# Patient Record
Sex: MALE | Race: WHITE | Employment: OTHER | URBAN - METROPOLITAN AREA
[De-identification: names, ages, dates, MRNs, and addresses within clinical notes are randomized per-mention and may not be internally consistent; named-entity substitution may affect disease eponyms.]

---

## 2017-04-10 ENCOUNTER — OFFICE VISIT (OUTPATIENT)
Dept: PULMONOLOGY | Age: 72
End: 2017-04-10
Payer: MEDICARE

## 2017-04-10 VITALS
WEIGHT: 307.8 LBS | TEMPERATURE: 97.6 F | HEIGHT: 68 IN | BODY MASS INDEX: 46.65 KG/M2 | OXYGEN SATURATION: 91 % | SYSTOLIC BLOOD PRESSURE: 99 MMHG | RESPIRATION RATE: 16 BRPM | DIASTOLIC BLOOD PRESSURE: 52 MMHG | HEART RATE: 84 BPM

## 2017-04-10 DIAGNOSIS — I10 ESSENTIAL HYPERTENSION: ICD-10-CM

## 2017-04-10 DIAGNOSIS — G47.33 OSA (OBSTRUCTIVE SLEEP APNEA): ICD-10-CM

## 2017-04-10 DIAGNOSIS — J98.4 RESTRICTIVE LUNG DISEASE SECONDARY TO OBESITY: ICD-10-CM

## 2017-04-10 DIAGNOSIS — I50.22 CHRONIC SYSTOLIC CONGESTIVE HEART FAILURE (HCC): ICD-10-CM

## 2017-04-10 DIAGNOSIS — E66.01 MORBID OBESITY WITH BMI OF 45.0-49.9, ADULT (HCC): ICD-10-CM

## 2017-04-10 DIAGNOSIS — J41.0 SIMPLE CHRONIC BRONCHITIS (HCC): Primary | ICD-10-CM

## 2017-04-10 DIAGNOSIS — E66.9 RESTRICTIVE LUNG DISEASE SECONDARY TO OBESITY: ICD-10-CM

## 2017-04-10 PROCEDURE — 3023F SPIROM DOC REV: CPT | Performed by: INTERNAL MEDICINE

## 2017-04-10 PROCEDURE — 1036F TOBACCO NON-USER: CPT | Performed by: INTERNAL MEDICINE

## 2017-04-10 PROCEDURE — 4040F PNEUMOC VAC/ADMIN/RCVD: CPT | Performed by: INTERNAL MEDICINE

## 2017-04-10 PROCEDURE — G8427 DOCREV CUR MEDS BY ELIG CLIN: HCPCS | Performed by: INTERNAL MEDICINE

## 2017-04-10 PROCEDURE — G8599 NO ASA/ANTIPLAT THER USE RNG: HCPCS | Performed by: INTERNAL MEDICINE

## 2017-04-10 PROCEDURE — 1123F ACP DISCUSS/DSCN MKR DOCD: CPT | Performed by: INTERNAL MEDICINE

## 2017-04-10 PROCEDURE — 3017F COLORECTAL CA SCREEN DOC REV: CPT | Performed by: INTERNAL MEDICINE

## 2017-04-10 PROCEDURE — G8926 SPIRO NO PERF OR DOC: HCPCS | Performed by: INTERNAL MEDICINE

## 2017-04-10 PROCEDURE — 99214 OFFICE O/P EST MOD 30 MIN: CPT | Performed by: INTERNAL MEDICINE

## 2017-04-10 PROCEDURE — G8419 CALC BMI OUT NRM PARAM NOF/U: HCPCS | Performed by: INTERNAL MEDICINE

## 2017-04-10 RX ORDER — BUDESONIDE AND FORMOTEROL FUMARATE DIHYDRATE 80; 4.5 UG/1; UG/1
2 AEROSOL RESPIRATORY (INHALATION) 2 TIMES DAILY
Qty: 1 INHALER | Refills: 11 | Status: SHIPPED | OUTPATIENT
Start: 2017-04-10 | End: 2018-02-14

## 2017-04-10 RX ORDER — ALBUTEROL SULFATE 90 UG/1
2 AEROSOL, METERED RESPIRATORY (INHALATION) EVERY 6 HOURS PRN
Qty: 1 INHALER | Refills: 11 | Status: SHIPPED | OUTPATIENT
Start: 2017-04-10

## 2017-04-10 RX ORDER — ATORVASTATIN CALCIUM 10 MG/1
10 TABLET, FILM COATED ORAL DAILY
COMMUNITY
End: 2017-08-22 | Stop reason: DRUGHIGH

## 2017-04-10 RX ORDER — BUDESONIDE AND FORMOTEROL FUMARATE DIHYDRATE 80; 4.5 UG/1; UG/1
2 AEROSOL RESPIRATORY (INHALATION) 2 TIMES DAILY
Qty: 1 INHALER | Refills: 11 | Status: SHIPPED | OUTPATIENT
Start: 2017-04-10 | End: 2017-04-10 | Stop reason: CLARIF

## 2017-04-10 RX ORDER — FUROSEMIDE 40 MG/1
40 TABLET ORAL 2 TIMES DAILY
COMMUNITY
End: 2018-03-05 | Stop reason: ALTCHOICE

## 2017-04-10 RX ORDER — ALBUTEROL SULFATE 90 UG/1
2 AEROSOL, METERED RESPIRATORY (INHALATION) EVERY 6 HOURS PRN
Qty: 1 INHALER | Refills: 11 | Status: SHIPPED | OUTPATIENT
Start: 2017-04-10 | End: 2017-04-10 | Stop reason: CLARIF

## 2017-08-04 ENCOUNTER — HOSPITAL ENCOUNTER (OUTPATIENT)
Dept: PREADMISSION TESTING | Age: 72
Discharge: HOME | End: 2017-08-04
Payer: MEDICARE

## 2017-08-04 ENCOUNTER — HOSPITAL ENCOUNTER (OUTPATIENT)
Dept: GENERAL RADIOLOGY | Age: 72
Discharge: HOME | End: 2017-08-04
Payer: MEDICARE

## 2017-08-04 VITALS
SYSTOLIC BLOOD PRESSURE: 140 MMHG | RESPIRATION RATE: 18 BRPM | BODY MASS INDEX: 46.21 KG/M2 | HEART RATE: 65 BPM | OXYGEN SATURATION: 92 % | DIASTOLIC BLOOD PRESSURE: 62 MMHG | WEIGHT: 304.9 LBS | HEIGHT: 68 IN

## 2017-08-04 LAB
ABO/RH: NORMAL
ABSOLUTE EOS #: 0.1 K/UL (ref 0–0.4)
ABSOLUTE LYMPH #: 1.3 K/UL (ref 1–4.8)
ABSOLUTE MONO #: 0.9 K/UL (ref 0.2–0.8)
ANION GAP SERPL CALCULATED.3IONS-SCNC: 13 MMOL/L (ref 9–17)
ANTIBODY SCREEN: NEGATIVE
ARM BAND NUMBER: NORMAL
BASOPHILS # BLD: 0 %
BASOPHILS ABSOLUTE: 0 K/UL (ref 0–0.2)
BUN BLDV-MCNC: 13 MG/DL (ref 8–23)
BUN/CREAT BLD: 13 (ref 9–20)
CALCIUM SERPL-MCNC: 8.9 MG/DL (ref 8.6–10.4)
CHLORIDE BLD-SCNC: 100 MMOL/L (ref 98–107)
CO2: 27 MMOL/L (ref 20–31)
CREAT SERPL-MCNC: 0.97 MG/DL (ref 0.7–1.2)
DIFFERENTIAL TYPE: ABNORMAL
EOSINOPHILS RELATIVE PERCENT: 1 %
EXPIRATION DATE: NORMAL
GFR AFRICAN AMERICAN: >60 ML/MIN
GFR NON-AFRICAN AMERICAN: >60 ML/MIN
GFR SERPL CREATININE-BSD FRML MDRD: ABNORMAL ML/MIN/{1.73_M2}
GFR SERPL CREATININE-BSD FRML MDRD: ABNORMAL ML/MIN/{1.73_M2}
GLUCOSE BLD-MCNC: 102 MG/DL (ref 70–99)
HCT VFR BLD CALC: 41.9 % (ref 41–53)
HEMOGLOBIN: 13.8 G/DL (ref 13.5–17.5)
INR BLD: 1
LYMPHOCYTES # BLD: 15 %
MCH RBC QN AUTO: 29.4 PG (ref 26–34)
MCHC RBC AUTO-ENTMCNC: 32.9 G/DL (ref 31–37)
MCV RBC AUTO: 89.2 FL (ref 80–100)
MONOCYTES # BLD: 10 %
PARTIAL THROMBOPLASTIN TIME: 30.2 SEC (ref 23–31)
PDW BLD-RTO: 16 % (ref 11.5–14.5)
PLATELET # BLD: 255 K/UL (ref 130–400)
PLATELET ESTIMATE: ABNORMAL
PMV BLD AUTO: 8.3 FL (ref 6–12)
POTASSIUM SERPL-SCNC: 4 MMOL/L (ref 3.7–5.3)
PROTHROMBIN TIME: 10 SEC (ref 9.7–11.6)
RBC # BLD: 4.69 M/UL (ref 4.5–5.9)
RBC # BLD: ABNORMAL 10*6/UL
SEG NEUTROPHILS: 74 %
SEGMENTED NEUTROPHILS ABSOLUTE COUNT: 6.8 K/UL (ref 1.8–7.7)
SODIUM BLD-SCNC: 140 MMOL/L (ref 135–144)
WBC # BLD: 9.2 K/UL (ref 3.5–11)
WBC # BLD: ABNORMAL 10*3/UL

## 2017-08-04 PROCEDURE — 85025 COMPLETE CBC W/AUTO DIFF WBC: CPT

## 2017-08-04 PROCEDURE — 36415 COLL VENOUS BLD VENIPUNCTURE: CPT

## 2017-08-04 PROCEDURE — 85730 THROMBOPLASTIN TIME PARTIAL: CPT

## 2017-08-04 PROCEDURE — 93005 ELECTROCARDIOGRAM TRACING: CPT

## 2017-08-04 PROCEDURE — 86850 RBC ANTIBODY SCREEN: CPT

## 2017-08-04 PROCEDURE — 86900 BLOOD TYPING SEROLOGIC ABO: CPT

## 2017-08-04 PROCEDURE — 86901 BLOOD TYPING SEROLOGIC RH(D): CPT

## 2017-08-04 PROCEDURE — 85610 PROTHROMBIN TIME: CPT

## 2017-08-04 PROCEDURE — 71020 XR CHEST STANDARD TWO VW: CPT

## 2017-08-04 PROCEDURE — 80048 BASIC METABOLIC PNL TOTAL CA: CPT

## 2017-08-04 RX ORDER — BUDESONIDE AND FORMOTEROL FUMARATE DIHYDRATE 80; 4.5 UG/1; UG/1
2 AEROSOL RESPIRATORY (INHALATION) 2 TIMES DAILY
Status: ON HOLD | COMMUNITY
End: 2017-08-19 | Stop reason: SDUPTHER

## 2017-08-05 LAB
EKG ATRIAL RATE: 68 BPM
EKG P AXIS: 65 DEGREES
EKG P-R INTERVAL: 240 MS
EKG Q-T INTERVAL: 434 MS
EKG QRS DURATION: 104 MS
EKG QTC CALCULATION (BAZETT): 461 MS
EKG R AXIS: -37 DEGREES
EKG T AXIS: 23 DEGREES
EKG VENTRICULAR RATE: 68 BPM

## 2017-08-17 ENCOUNTER — ANESTHESIA EVENT (OUTPATIENT)
Dept: OPERATING ROOM | Age: 72
DRG: 519 | End: 2017-08-17
Payer: MEDICARE

## 2017-08-18 ENCOUNTER — HOSPITAL ENCOUNTER (INPATIENT)
Age: 72
LOS: 1 days | Discharge: HOME | DRG: 519 | End: 2017-08-19
Attending: ORTHOPAEDIC SURGERY | Admitting: ORTHOPAEDIC SURGERY
Payer: MEDICARE

## 2017-08-18 ENCOUNTER — ANESTHESIA (OUTPATIENT)
Dept: OPERATING ROOM | Age: 72
DRG: 519 | End: 2017-08-18
Payer: MEDICARE

## 2017-08-18 ENCOUNTER — APPOINTMENT (OUTPATIENT)
Dept: GENERAL RADIOLOGY | Age: 72
DRG: 519 | End: 2017-08-18
Attending: ORTHOPAEDIC SURGERY
Payer: MEDICARE

## 2017-08-18 VITALS
TEMPERATURE: 97 F | DIASTOLIC BLOOD PRESSURE: 57 MMHG | RESPIRATION RATE: 12 BRPM | OXYGEN SATURATION: 96 % | SYSTOLIC BLOOD PRESSURE: 107 MMHG

## 2017-08-18 DIAGNOSIS — M48.061 STENOSIS, SPINAL, LUMBAR: Primary | ICD-10-CM

## 2017-08-18 LAB
GLUCOSE BLD-MCNC: 130 MG/DL (ref 75–110)
GLUCOSE BLD-MCNC: 141 MG/DL (ref 75–110)
GLUCOSE BLD-MCNC: 179 MG/DL (ref 75–110)
GLUCOSE BLD-MCNC: 197 MG/DL (ref 75–110)

## 2017-08-18 PROCEDURE — 2580000003 HC RX 258: Performed by: ANESTHESIOLOGY

## 2017-08-18 PROCEDURE — 2580000003 HC RX 258: Performed by: PHYSICIAN ASSISTANT

## 2017-08-18 PROCEDURE — 2500000003 HC RX 250 WO HCPCS: Performed by: ORTHOPAEDIC SURGERY

## 2017-08-18 PROCEDURE — 3600000002 HC SURGERY LEVEL 2 BASE: Performed by: ORTHOPAEDIC SURGERY

## 2017-08-18 PROCEDURE — 2580000003 HC RX 258: Performed by: ORTHOPAEDIC SURGERY

## 2017-08-18 PROCEDURE — 6360000002 HC RX W HCPCS: Performed by: ANESTHESIOLOGY

## 2017-08-18 PROCEDURE — 1200000000 HC SEMI PRIVATE

## 2017-08-18 PROCEDURE — 6360000002 HC RX W HCPCS: Performed by: PHYSICIAN ASSISTANT

## 2017-08-18 PROCEDURE — 6360000002 HC RX W HCPCS: Performed by: NURSE ANESTHETIST, CERTIFIED REGISTERED

## 2017-08-18 PROCEDURE — 6370000000 HC RX 637 (ALT 250 FOR IP): Performed by: PHYSICIAN ASSISTANT

## 2017-08-18 PROCEDURE — 6360000002 HC RX W HCPCS: Performed by: ORTHOPAEDIC SURGERY

## 2017-08-18 PROCEDURE — 3209999900 FLUORO FOR SURGICAL PROCEDURES

## 2017-08-18 PROCEDURE — A6219 GAUZE <= 16 SQ IN W/BORDER: HCPCS | Performed by: ORTHOPAEDIC SURGERY

## 2017-08-18 PROCEDURE — 6370000000 HC RX 637 (ALT 250 FOR IP): Performed by: ORTHOPAEDIC SURGERY

## 2017-08-18 PROCEDURE — 3700000000 HC ANESTHESIA ATTENDED CARE: Performed by: ORTHOPAEDIC SURGERY

## 2017-08-18 PROCEDURE — 3700000001 HC ADD 15 MINUTES (ANESTHESIA): Performed by: ORTHOPAEDIC SURGERY

## 2017-08-18 PROCEDURE — 3600000012 HC SURGERY LEVEL 2 ADDTL 15MIN: Performed by: ORTHOPAEDIC SURGERY

## 2017-08-18 PROCEDURE — 7100000001 HC PACU RECOVERY - ADDTL 15 MIN: Performed by: ORTHOPAEDIC SURGERY

## 2017-08-18 PROCEDURE — 2720000010 HC SURG SUPPLY STERILE: Performed by: ORTHOPAEDIC SURGERY

## 2017-08-18 PROCEDURE — A4364 ADHESIVE, LIQUID OR EQUAL: HCPCS | Performed by: ORTHOPAEDIC SURGERY

## 2017-08-18 PROCEDURE — 2500000003 HC RX 250 WO HCPCS: Performed by: NURSE ANESTHETIST, CERTIFIED REGISTERED

## 2017-08-18 PROCEDURE — 7100000000 HC PACU RECOVERY - FIRST 15 MIN: Performed by: ORTHOPAEDIC SURGERY

## 2017-08-18 PROCEDURE — 2780000010 HC IMPLANT OTHER: Performed by: ORTHOPAEDIC SURGERY

## 2017-08-18 PROCEDURE — 00NY0ZZ RELEASE LUMBAR SPINAL CORD, OPEN APPROACH: ICD-10-PCS | Performed by: ORTHOPAEDIC SURGERY

## 2017-08-18 PROCEDURE — 82947 ASSAY GLUCOSE BLOOD QUANT: CPT

## 2017-08-18 PROCEDURE — 94640 AIRWAY INHALATION TREATMENT: CPT

## 2017-08-18 RX ORDER — SODIUM CHLORIDE 9 MG/ML
INJECTION, SOLUTION INTRAVENOUS CONTINUOUS
Status: DISCONTINUED | OUTPATIENT
Start: 2017-08-18 | End: 2017-08-19 | Stop reason: HOSPADM

## 2017-08-18 RX ORDER — DEXAMETHASONE SODIUM PHOSPHATE 10 MG/ML
6 INJECTION INTRAMUSCULAR; INTRAVENOUS EVERY 8 HOURS
Status: COMPLETED | OUTPATIENT
Start: 2017-08-18 | End: 2017-08-19

## 2017-08-18 RX ORDER — HYDRALAZINE HYDROCHLORIDE 50 MG/1
50 TABLET, FILM COATED ORAL 2 TIMES DAILY
Status: DISCONTINUED | OUTPATIENT
Start: 2017-08-18 | End: 2017-08-19 | Stop reason: HOSPADM

## 2017-08-18 RX ORDER — HYDROMORPHONE HCL 110MG/55ML
0.5 PATIENT CONTROLLED ANALGESIA SYRINGE INTRAVENOUS EVERY 5 MIN PRN
Status: COMPLETED | OUTPATIENT
Start: 2017-08-18 | End: 2017-08-18

## 2017-08-18 RX ORDER — DIAZEPAM 5 MG/1
5 TABLET ORAL EVERY 6 HOURS PRN
Status: DISCONTINUED | OUTPATIENT
Start: 2017-08-18 | End: 2017-08-19 | Stop reason: HOSPADM

## 2017-08-18 RX ORDER — ONDANSETRON 2 MG/ML
INJECTION INTRAMUSCULAR; INTRAVENOUS PRN
Status: DISCONTINUED | OUTPATIENT
Start: 2017-08-18 | End: 2017-08-18 | Stop reason: SDUPTHER

## 2017-08-18 RX ORDER — DEXTROSE MONOHYDRATE 50 MG/ML
100 INJECTION, SOLUTION INTRAVENOUS PRN
Status: DISCONTINUED | OUTPATIENT
Start: 2017-08-18 | End: 2017-08-19 | Stop reason: HOSPADM

## 2017-08-18 RX ORDER — VECURONIUM BROMIDE 1 MG/ML
INJECTION, POWDER, LYOPHILIZED, FOR SOLUTION INTRAVENOUS PRN
Status: DISCONTINUED | OUTPATIENT
Start: 2017-08-18 | End: 2017-08-18 | Stop reason: SDUPTHER

## 2017-08-18 RX ORDER — DEXAMETHASONE SODIUM PHOSPHATE 10 MG/ML
4 INJECTION INTRAMUSCULAR; INTRAVENOUS
Status: DISCONTINUED | OUTPATIENT
Start: 2017-08-18 | End: 2017-08-18 | Stop reason: HOSPADM

## 2017-08-18 RX ORDER — FUROSEMIDE 40 MG/1
40 TABLET ORAL 2 TIMES DAILY
Status: DISCONTINUED | OUTPATIENT
Start: 2017-08-18 | End: 2017-08-19 | Stop reason: HOSPADM

## 2017-08-18 RX ORDER — FENTANYL CITRATE 50 UG/ML
25 INJECTION, SOLUTION INTRAMUSCULAR; INTRAVENOUS EVERY 5 MIN PRN
Status: DISCONTINUED | OUTPATIENT
Start: 2017-08-18 | End: 2017-08-18 | Stop reason: HOSPADM

## 2017-08-18 RX ORDER — NITROGLYCERIN 0.4 MG/1
0.4 TABLET SUBLINGUAL EVERY 5 MIN PRN
Status: DISCONTINUED | OUTPATIENT
Start: 2017-08-18 | End: 2017-08-19 | Stop reason: HOSPADM

## 2017-08-18 RX ORDER — FLUTICASONE PROPIONATE 50 MCG
2 SPRAY, SUSPENSION (ML) NASAL DAILY
Status: DISCONTINUED | OUTPATIENT
Start: 2017-08-18 | End: 2017-08-19 | Stop reason: HOSPADM

## 2017-08-18 RX ORDER — DEXAMETHASONE SODIUM PHOSPHATE 10 MG/ML
INJECTION INTRAMUSCULAR; INTRAVENOUS PRN
Status: DISCONTINUED | OUTPATIENT
Start: 2017-08-18 | End: 2017-08-18 | Stop reason: SDUPTHER

## 2017-08-18 RX ORDER — ATORVASTATIN CALCIUM 10 MG/1
10 TABLET, FILM COATED ORAL NIGHTLY
Status: DISCONTINUED | OUTPATIENT
Start: 2017-08-18 | End: 2017-08-19 | Stop reason: HOSPADM

## 2017-08-18 RX ORDER — GLYCOPYRROLATE 0.2 MG/ML
INJECTION INTRAMUSCULAR; INTRAVENOUS PRN
Status: DISCONTINUED | OUTPATIENT
Start: 2017-08-18 | End: 2017-08-18 | Stop reason: SDUPTHER

## 2017-08-18 RX ORDER — DEXTROSE MONOHYDRATE 25 G/50ML
12.5 INJECTION, SOLUTION INTRAVENOUS PRN
Status: DISCONTINUED | OUTPATIENT
Start: 2017-08-18 | End: 2017-08-19 | Stop reason: HOSPADM

## 2017-08-18 RX ORDER — SODIUM CHLORIDE 0.9 % (FLUSH) 0.9 %
10 SYRINGE (ML) INJECTION PRN
Status: DISCONTINUED | OUTPATIENT
Start: 2017-08-18 | End: 2017-08-19 | Stop reason: HOSPADM

## 2017-08-18 RX ORDER — HYDROMORPHONE HCL 110MG/55ML
0.5 PATIENT CONTROLLED ANALGESIA SYRINGE INTRAVENOUS EVERY 5 MIN PRN
Status: DISCONTINUED | OUTPATIENT
Start: 2017-08-18 | End: 2017-08-18 | Stop reason: HOSPADM

## 2017-08-18 RX ORDER — MORPHINE SULFATE 4 MG/ML
4 INJECTION, SOLUTION INTRAMUSCULAR; INTRAVENOUS
Status: DISCONTINUED | OUTPATIENT
Start: 2017-08-18 | End: 2017-08-19 | Stop reason: HOSPADM

## 2017-08-18 RX ORDER — LISINOPRIL 40 MG/1
40 TABLET ORAL DAILY
Status: DISCONTINUED | OUTPATIENT
Start: 2017-08-18 | End: 2017-08-19 | Stop reason: HOSPADM

## 2017-08-18 RX ORDER — SODIUM CHLORIDE 0.9 % (FLUSH) 0.9 %
10 SYRINGE (ML) INJECTION EVERY 12 HOURS SCHEDULED
Status: DISCONTINUED | OUTPATIENT
Start: 2017-08-18 | End: 2017-08-19 | Stop reason: HOSPADM

## 2017-08-18 RX ORDER — SUCCINYLCHOLINE CHLORIDE 20 MG/ML
INJECTION INTRAMUSCULAR; INTRAVENOUS PRN
Status: DISCONTINUED | OUTPATIENT
Start: 2017-08-18 | End: 2017-08-18 | Stop reason: SDUPTHER

## 2017-08-18 RX ORDER — MORPHINE SULFATE 2 MG/ML
2 INJECTION, SOLUTION INTRAMUSCULAR; INTRAVENOUS
Status: DISCONTINUED | OUTPATIENT
Start: 2017-08-18 | End: 2017-08-19 | Stop reason: HOSPADM

## 2017-08-18 RX ORDER — ALBUTEROL SULFATE 90 UG/1
2 AEROSOL, METERED RESPIRATORY (INHALATION) EVERY 6 HOURS PRN
Status: DISCONTINUED | OUTPATIENT
Start: 2017-08-18 | End: 2017-08-19 | Stop reason: HOSPADM

## 2017-08-18 RX ORDER — SODIUM CHLORIDE, SODIUM LACTATE, POTASSIUM CHLORIDE, CALCIUM CHLORIDE 600; 310; 30; 20 MG/100ML; MG/100ML; MG/100ML; MG/100ML
INJECTION, SOLUTION INTRAVENOUS CONTINUOUS
Status: DISCONTINUED | OUTPATIENT
Start: 2017-08-18 | End: 2017-08-18

## 2017-08-18 RX ORDER — DOCUSATE SODIUM 100 MG/1
100 CAPSULE, LIQUID FILLED ORAL 2 TIMES DAILY
Status: DISCONTINUED | OUTPATIENT
Start: 2017-08-18 | End: 2017-08-19 | Stop reason: HOSPADM

## 2017-08-18 RX ORDER — LANOLIN ALCOHOL/MO/W.PET/CERES
1000 CREAM (GRAM) TOPICAL DAILY
Status: DISCONTINUED | OUTPATIENT
Start: 2017-08-18 | End: 2017-08-19 | Stop reason: HOSPADM

## 2017-08-18 RX ORDER — DIPHENHYDRAMINE HYDROCHLORIDE 50 MG/ML
12.5 INJECTION INTRAMUSCULAR; INTRAVENOUS
Status: DISCONTINUED | OUTPATIENT
Start: 2017-08-18 | End: 2017-08-18 | Stop reason: HOSPADM

## 2017-08-18 RX ORDER — MORPHINE SULFATE 15 MG/1
15 TABLET, FILM COATED, EXTENDED RELEASE ORAL EVERY 12 HOURS SCHEDULED
Status: DISCONTINUED | OUTPATIENT
Start: 2017-08-18 | End: 2017-08-19 | Stop reason: HOSPADM

## 2017-08-18 RX ORDER — LABETALOL HYDROCHLORIDE 5 MG/ML
5 INJECTION, SOLUTION INTRAVENOUS EVERY 10 MIN PRN
Status: DISCONTINUED | OUTPATIENT
Start: 2017-08-18 | End: 2017-08-18 | Stop reason: HOSPADM

## 2017-08-18 RX ORDER — BUPIVACAINE HYDROCHLORIDE AND EPINEPHRINE 5; 5 MG/ML; UG/ML
INJECTION, SOLUTION EPIDURAL; INTRACAUDAL; PERINEURAL PRN
Status: DISCONTINUED | OUTPATIENT
Start: 2017-08-18 | End: 2017-08-18 | Stop reason: HOSPADM

## 2017-08-18 RX ORDER — LIDOCAINE HYDROCHLORIDE 20 MG/ML
INJECTION, SOLUTION INFILTRATION; PERINEURAL PRN
Status: DISCONTINUED | OUTPATIENT
Start: 2017-08-18 | End: 2017-08-18 | Stop reason: SDUPTHER

## 2017-08-18 RX ORDER — SODIUM CHLORIDE 0.9 % (FLUSH) 0.9 %
10 SYRINGE (ML) INJECTION PRN
Status: DISCONTINUED | OUTPATIENT
Start: 2017-08-18 | End: 2017-08-18 | Stop reason: HOSPADM

## 2017-08-18 RX ORDER — HYDRALAZINE HYDROCHLORIDE 20 MG/ML
5 INJECTION INTRAMUSCULAR; INTRAVENOUS EVERY 10 MIN PRN
Status: DISCONTINUED | OUTPATIENT
Start: 2017-08-18 | End: 2017-08-18 | Stop reason: HOSPADM

## 2017-08-18 RX ORDER — FENTANYL CITRATE 50 UG/ML
INJECTION, SOLUTION INTRAMUSCULAR; INTRAVENOUS PRN
Status: DISCONTINUED | OUTPATIENT
Start: 2017-08-18 | End: 2017-08-18 | Stop reason: SDUPTHER

## 2017-08-18 RX ORDER — NICOTINE POLACRILEX 4 MG
15 LOZENGE BUCCAL PRN
Status: DISCONTINUED | OUTPATIENT
Start: 2017-08-18 | End: 2017-08-19 | Stop reason: HOSPADM

## 2017-08-18 RX ORDER — KETAMINE HYDROCHLORIDE 50 MG/ML
INJECTION, SOLUTION, CONCENTRATE INTRAMUSCULAR; INTRAVENOUS PRN
Status: DISCONTINUED | OUTPATIENT
Start: 2017-08-18 | End: 2017-08-18 | Stop reason: SDUPTHER

## 2017-08-18 RX ORDER — SODIUM CHLORIDE 0.9 % (FLUSH) 0.9 %
10 SYRINGE (ML) INJECTION EVERY 12 HOURS SCHEDULED
Status: DISCONTINUED | OUTPATIENT
Start: 2017-08-18 | End: 2017-08-18 | Stop reason: HOSPADM

## 2017-08-18 RX ORDER — PROPOFOL 10 MG/ML
INJECTION, EMULSION INTRAVENOUS PRN
Status: DISCONTINUED | OUTPATIENT
Start: 2017-08-18 | End: 2017-08-18 | Stop reason: SDUPTHER

## 2017-08-18 RX ORDER — MORPHINE SULFATE 15 MG/1
15 TABLET, FILM COATED, EXTENDED RELEASE ORAL EVERY 12 HOURS SCHEDULED
Status: DISCONTINUED | OUTPATIENT
Start: 2017-08-18 | End: 2017-08-18

## 2017-08-18 RX ORDER — OXYCODONE HYDROCHLORIDE AND ACETAMINOPHEN 5; 325 MG/1; MG/1
1 TABLET ORAL EVERY 4 HOURS PRN
Status: DISCONTINUED | OUTPATIENT
Start: 2017-08-18 | End: 2017-08-19 | Stop reason: HOSPADM

## 2017-08-18 RX ORDER — OXYCODONE HYDROCHLORIDE AND ACETAMINOPHEN 5; 325 MG/1; MG/1
2 TABLET ORAL EVERY 4 HOURS PRN
Status: DISCONTINUED | OUTPATIENT
Start: 2017-08-18 | End: 2017-08-19 | Stop reason: HOSPADM

## 2017-08-18 RX ORDER — ONDANSETRON 2 MG/ML
4 INJECTION INTRAMUSCULAR; INTRAVENOUS
Status: COMPLETED | OUTPATIENT
Start: 2017-08-18 | End: 2017-08-18

## 2017-08-18 RX ORDER — MEPERIDINE HYDROCHLORIDE 25 MG/ML
12.5 INJECTION INTRAMUSCULAR; INTRAVENOUS; SUBCUTANEOUS EVERY 5 MIN PRN
Status: DISCONTINUED | OUTPATIENT
Start: 2017-08-18 | End: 2017-08-18 | Stop reason: HOSPADM

## 2017-08-18 RX ORDER — ONDANSETRON 2 MG/ML
4 INJECTION INTRAMUSCULAR; INTRAVENOUS EVERY 6 HOURS PRN
Status: DISCONTINUED | OUTPATIENT
Start: 2017-08-18 | End: 2017-08-19 | Stop reason: HOSPADM

## 2017-08-18 RX ORDER — AMLODIPINE BESYLATE 10 MG/1
10 TABLET ORAL DAILY
Status: DISCONTINUED | OUTPATIENT
Start: 2017-08-19 | End: 2017-08-19 | Stop reason: HOSPADM

## 2017-08-18 RX ORDER — METOPROLOL TARTRATE 50 MG/1
50 TABLET, FILM COATED ORAL 2 TIMES DAILY
Status: DISCONTINUED | OUTPATIENT
Start: 2017-08-18 | End: 2017-08-19 | Stop reason: HOSPADM

## 2017-08-18 RX ORDER — OXYCODONE HYDROCHLORIDE 5 MG/1
5 TABLET ORAL EVERY 4 HOURS PRN
Status: DISCONTINUED | OUTPATIENT
Start: 2017-08-18 | End: 2017-08-19 | Stop reason: HOSPADM

## 2017-08-18 RX ORDER — EPHEDRINE SULFATE 50 MG/ML
INJECTION, SOLUTION INTRAVENOUS PRN
Status: DISCONTINUED | OUTPATIENT
Start: 2017-08-18 | End: 2017-08-18 | Stop reason: SDUPTHER

## 2017-08-18 RX ORDER — GABAPENTIN 300 MG/1
300 CAPSULE ORAL 2 TIMES DAILY
Status: DISCONTINUED | OUTPATIENT
Start: 2017-08-18 | End: 2017-08-19 | Stop reason: HOSPADM

## 2017-08-18 RX ADMIN — SUCCINYLCHOLINE CHLORIDE 120 MG: 20 INJECTION, SOLUTION INTRAMUSCULAR; INTRAVENOUS at 11:53

## 2017-08-18 RX ADMIN — Medication 0.2 MG: at 12:14

## 2017-08-18 RX ADMIN — DEXAMETHASONE SODIUM PHOSPHATE 10 MG: 10 INJECTION INTRAMUSCULAR; INTRAVENOUS at 11:47

## 2017-08-18 RX ADMIN — SUGAMMADEX 200 MG: 100 INJECTION, SOLUTION INTRAVENOUS at 13:51

## 2017-08-18 RX ADMIN — VECURONIUM BROMIDE 3 MG: 1 INJECTION, POWDER, LYOPHILIZED, FOR SOLUTION INTRAVENOUS at 12:13

## 2017-08-18 RX ADMIN — EPHEDRINE SULFATE 10 MG: 50 INJECTION INTRAMUSCULAR; INTRAVENOUS; SUBCUTANEOUS at 12:13

## 2017-08-18 RX ADMIN — CEFAZOLIN 3 G: 1 INJECTION, POWDER, FOR SOLUTION INTRAMUSCULAR; INTRAVENOUS at 21:10

## 2017-08-18 RX ADMIN — EPHEDRINE SULFATE 10 MG: 50 INJECTION INTRAMUSCULAR; INTRAVENOUS; SUBCUTANEOUS at 13:27

## 2017-08-18 RX ADMIN — HYDROMORPHONE HYDROCHLORIDE 0.5 MG: 2 INJECTION, SOLUTION INTRAMUSCULAR; INTRAVENOUS; SUBCUTANEOUS at 15:00

## 2017-08-18 RX ADMIN — SODIUM CHLORIDE: 9 INJECTION, SOLUTION INTRAVENOUS at 15:58

## 2017-08-18 RX ADMIN — LIDOCAINE HYDROCHLORIDE 100 MG: 20 INJECTION, SOLUTION INFILTRATION; PERINEURAL at 11:53

## 2017-08-18 RX ADMIN — EPHEDRINE SULFATE 10 MG: 50 INJECTION INTRAMUSCULAR; INTRAVENOUS; SUBCUTANEOUS at 12:25

## 2017-08-18 RX ADMIN — SODIUM CHLORIDE, POTASSIUM CHLORIDE, SODIUM LACTATE AND CALCIUM CHLORIDE: 600; 310; 30; 20 INJECTION, SOLUTION INTRAVENOUS at 11:39

## 2017-08-18 RX ADMIN — KETAMINE HYDROCHLORIDE 50 MG: 50 INJECTION, SOLUTION INTRAMUSCULAR; INTRAVENOUS at 12:12

## 2017-08-18 RX ADMIN — PROPOFOL 150 MG: 10 INJECTION, EMULSION INTRAVENOUS at 11:53

## 2017-08-18 RX ADMIN — HYDROMORPHONE HYDROCHLORIDE 0.5 MG: 2 INJECTION, SOLUTION INTRAMUSCULAR; INTRAVENOUS; SUBCUTANEOUS at 14:46

## 2017-08-18 RX ADMIN — SODIUM CHLORIDE, POTASSIUM CHLORIDE, SODIUM LACTATE AND CALCIUM CHLORIDE: 600; 310; 30; 20 INJECTION, SOLUTION INTRAVENOUS at 12:26

## 2017-08-18 RX ADMIN — MOMETASONE FUROATE AND FORMOTEROL FUMARATE DIHYDRATE 2 PUFF: 100; 5 AEROSOL RESPIRATORY (INHALATION) at 20:01

## 2017-08-18 RX ADMIN — FENTANYL CITRATE 50 MCG: 50 INJECTION, SOLUTION INTRAMUSCULAR; INTRAVENOUS at 13:20

## 2017-08-18 RX ADMIN — HYDRALAZINE HYDROCHLORIDE 50 MG: 50 TABLET ORAL at 21:20

## 2017-08-18 RX ADMIN — FENTANYL CITRATE 50 MCG: 50 INJECTION, SOLUTION INTRAMUSCULAR; INTRAVENOUS at 12:31

## 2017-08-18 RX ADMIN — FENTANYL CITRATE 100 MCG: 50 INJECTION, SOLUTION INTRAMUSCULAR; INTRAVENOUS at 11:53

## 2017-08-18 RX ADMIN — HYDROMORPHONE HYDROCHLORIDE 0.5 MG: 2 INJECTION, SOLUTION INTRAMUSCULAR; INTRAVENOUS; SUBCUTANEOUS at 15:23

## 2017-08-18 RX ADMIN — DEXAMETHASONE SODIUM PHOSPHATE 6 MG: 10 INJECTION INTRAMUSCULAR; INTRAVENOUS at 21:20

## 2017-08-18 RX ADMIN — VECURONIUM BROMIDE 5 MG: 1 INJECTION, POWDER, LYOPHILIZED, FOR SOLUTION INTRAVENOUS at 11:57

## 2017-08-18 RX ADMIN — FENTANYL CITRATE 50 MCG: 50 INJECTION, SOLUTION INTRAMUSCULAR; INTRAVENOUS at 13:25

## 2017-08-18 RX ADMIN — DIAZEPAM 5 MG: 5 TABLET ORAL at 23:43

## 2017-08-18 RX ADMIN — ONDANSETRON 4 MG: 2 INJECTION INTRAMUSCULAR; INTRAVENOUS at 11:47

## 2017-08-18 RX ADMIN — MORPHINE SULFATE 4 MG: 4 INJECTION, SOLUTION INTRAMUSCULAR; INTRAVENOUS at 18:03

## 2017-08-18 RX ADMIN — EPHEDRINE SULFATE 10 MG: 50 INJECTION INTRAMUSCULAR; INTRAVENOUS; SUBCUTANEOUS at 12:44

## 2017-08-18 RX ADMIN — PROPOFOL 50 MG: 10 INJECTION, EMULSION INTRAVENOUS at 14:08

## 2017-08-18 RX ADMIN — MORPHINE SULFATE 15 MG: 15 TABLET, FILM COATED, EXTENDED RELEASE ORAL at 21:10

## 2017-08-18 RX ADMIN — HYDROMORPHONE HYDROCHLORIDE 0.5 MG: 2 INJECTION, SOLUTION INTRAMUSCULAR; INTRAVENOUS; SUBCUTANEOUS at 15:15

## 2017-08-18 RX ADMIN — MORPHINE SULFATE 4 MG: 4 INJECTION, SOLUTION INTRAMUSCULAR; INTRAVENOUS at 15:58

## 2017-08-18 RX ADMIN — EPHEDRINE SULFATE 10 MG: 50 INJECTION INTRAMUSCULAR; INTRAVENOUS; SUBCUTANEOUS at 12:08

## 2017-08-18 RX ADMIN — ONDANSETRON 4 MG: 2 INJECTION INTRAMUSCULAR; INTRAVENOUS at 14:56

## 2017-08-18 RX ADMIN — INSULIN LISPRO 1 UNITS: 100 INJECTION, SOLUTION INTRAVENOUS; SUBCUTANEOUS at 21:22

## 2017-08-18 RX ADMIN — SODIUM CHLORIDE, POTASSIUM CHLORIDE, SODIUM LACTATE AND CALCIUM CHLORIDE: 600; 310; 30; 20 INJECTION, SOLUTION INTRAVENOUS at 15:37

## 2017-08-18 RX ADMIN — CEFAZOLIN SODIUM 3 G: 10 INJECTION, POWDER, FOR SOLUTION INTRAVENOUS at 12:00

## 2017-08-18 ASSESSMENT — PAIN DESCRIPTION - ONSET: ONSET: ON-GOING

## 2017-08-18 ASSESSMENT — PAIN DESCRIPTION - PROGRESSION
CLINICAL_PROGRESSION: NOT CHANGED
CLINICAL_PROGRESSION: GRADUALLY IMPROVING

## 2017-08-18 ASSESSMENT — PAIN SCALES - GENERAL
PAINLEVEL_OUTOF10: 3
PAINLEVEL_OUTOF10: 7
PAINLEVEL_OUTOF10: 8
PAINLEVEL_OUTOF10: 7
PAINLEVEL_OUTOF10: 6
PAINLEVEL_OUTOF10: 10
PAINLEVEL_OUTOF10: 7
PAINLEVEL_OUTOF10: 0
PAINLEVEL_OUTOF10: 0
PAINLEVEL_OUTOF10: 9

## 2017-08-18 ASSESSMENT — PAIN DESCRIPTION - ORIENTATION
ORIENTATION: LOWER;MID
ORIENTATION: LOWER;MID

## 2017-08-18 ASSESSMENT — PAIN DESCRIPTION - DESCRIPTORS
DESCRIPTORS: CONSTANT;SHARP
DESCRIPTORS: CONSTANT;SHARP

## 2017-08-18 ASSESSMENT — PAIN DESCRIPTION - FREQUENCY
FREQUENCY: CONTINUOUS
FREQUENCY: CONTINUOUS

## 2017-08-18 ASSESSMENT — PAIN DESCRIPTION - PAIN TYPE
TYPE: SURGICAL PAIN

## 2017-08-18 ASSESSMENT — PAIN - FUNCTIONAL ASSESSMENT: PAIN_FUNCTIONAL_ASSESSMENT: 0-10

## 2017-08-18 ASSESSMENT — PAIN DESCRIPTION - LOCATION
LOCATION: BACK

## 2017-08-19 VITALS
RESPIRATION RATE: 19 BRPM | TEMPERATURE: 98.1 F | HEIGHT: 70 IN | HEART RATE: 79 BPM | SYSTOLIC BLOOD PRESSURE: 118 MMHG | WEIGHT: 298 LBS | OXYGEN SATURATION: 93 % | DIASTOLIC BLOOD PRESSURE: 62 MMHG | BODY MASS INDEX: 42.66 KG/M2

## 2017-08-19 LAB
ABSOLUTE EOS #: 0 K/UL (ref 0–0.4)
ABSOLUTE LYMPH #: 0.4 K/UL (ref 1–4.8)
ABSOLUTE MONO #: 0.4 K/UL (ref 0.2–0.8)
ANION GAP SERPL CALCULATED.3IONS-SCNC: 14 MMOL/L (ref 9–17)
BASOPHILS # BLD: 0 %
BASOPHILS ABSOLUTE: 0 K/UL (ref 0–0.2)
BUN BLDV-MCNC: 17 MG/DL (ref 8–23)
BUN/CREAT BLD: 16 (ref 9–20)
CALCIUM SERPL-MCNC: 8.4 MG/DL (ref 8.6–10.4)
CHLORIDE BLD-SCNC: 99 MMOL/L (ref 98–107)
CO2: 25 MMOL/L (ref 20–31)
CREAT SERPL-MCNC: 1.05 MG/DL (ref 0.7–1.2)
DIFFERENTIAL TYPE: ABNORMAL
EOSINOPHILS RELATIVE PERCENT: 0 %
GFR AFRICAN AMERICAN: >60 ML/MIN
GFR NON-AFRICAN AMERICAN: >60 ML/MIN
GFR SERPL CREATININE-BSD FRML MDRD: ABNORMAL ML/MIN/{1.73_M2}
GFR SERPL CREATININE-BSD FRML MDRD: ABNORMAL ML/MIN/{1.73_M2}
GLUCOSE BLD-MCNC: 228 MG/DL (ref 75–110)
GLUCOSE BLD-MCNC: 236 MG/DL (ref 75–110)
GLUCOSE BLD-MCNC: 262 MG/DL (ref 70–99)
HCT VFR BLD CALC: 38.5 % (ref 41–53)
HEMOGLOBIN: 12.4 G/DL (ref 13.5–17.5)
LYMPHOCYTES # BLD: 3 %
MAGNESIUM: 2 MG/DL (ref 1.6–2.6)
MCH RBC QN AUTO: 29 PG (ref 26–34)
MCHC RBC AUTO-ENTMCNC: 32.3 G/DL (ref 31–37)
MCV RBC AUTO: 89.9 FL (ref 80–100)
MONOCYTES # BLD: 3 %
PDW BLD-RTO: 16 % (ref 11.5–14.5)
PLATELET # BLD: 252 K/UL (ref 130–400)
PLATELET ESTIMATE: ABNORMAL
PMV BLD AUTO: 8.2 FL (ref 6–12)
POTASSIUM SERPL-SCNC: 4.5 MMOL/L (ref 3.7–5.3)
RBC # BLD: 4.28 M/UL (ref 4.5–5.9)
RBC # BLD: ABNORMAL 10*6/UL
SEG NEUTROPHILS: 94 %
SEGMENTED NEUTROPHILS ABSOLUTE COUNT: 13.8 K/UL (ref 1.8–7.7)
SODIUM BLD-SCNC: 138 MMOL/L (ref 135–144)
WBC # BLD: 14.6 K/UL (ref 3.5–11)
WBC # BLD: ABNORMAL 10*3/UL

## 2017-08-19 PROCEDURE — G0009 ADMIN PNEUMOCOCCAL VACCINE: HCPCS | Performed by: ORTHOPAEDIC SURGERY

## 2017-08-19 PROCEDURE — 6370000000 HC RX 637 (ALT 250 FOR IP): Performed by: PHYSICIAN ASSISTANT

## 2017-08-19 PROCEDURE — 51798 US URINE CAPACITY MEASURE: CPT

## 2017-08-19 PROCEDURE — 94760 N-INVAS EAR/PLS OXIMETRY 1: CPT

## 2017-08-19 PROCEDURE — 97116 GAIT TRAINING THERAPY: CPT

## 2017-08-19 PROCEDURE — 6370000000 HC RX 637 (ALT 250 FOR IP): Performed by: ORTHOPAEDIC SURGERY

## 2017-08-19 PROCEDURE — 6360000002 HC RX W HCPCS: Performed by: PHYSICIAN ASSISTANT

## 2017-08-19 PROCEDURE — 94640 AIRWAY INHALATION TREATMENT: CPT

## 2017-08-19 PROCEDURE — 2580000003 HC RX 258: Performed by: PHYSICIAN ASSISTANT

## 2017-08-19 PROCEDURE — 36415 COLL VENOUS BLD VENIPUNCTURE: CPT

## 2017-08-19 PROCEDURE — 2700000000 HC OXYGEN THERAPY PER DAY

## 2017-08-19 PROCEDURE — 97161 PT EVAL LOW COMPLEX 20 MIN: CPT

## 2017-08-19 PROCEDURE — 97530 THERAPEUTIC ACTIVITIES: CPT

## 2017-08-19 PROCEDURE — 80048 BASIC METABOLIC PNL TOTAL CA: CPT

## 2017-08-19 PROCEDURE — 90670 PCV13 VACCINE IM: CPT | Performed by: ORTHOPAEDIC SURGERY

## 2017-08-19 PROCEDURE — 6360000002 HC RX W HCPCS: Performed by: ORTHOPAEDIC SURGERY

## 2017-08-19 PROCEDURE — 82947 ASSAY GLUCOSE BLOOD QUANT: CPT

## 2017-08-19 PROCEDURE — 83735 ASSAY OF MAGNESIUM: CPT

## 2017-08-19 PROCEDURE — G8979 MOBILITY GOAL STATUS: HCPCS

## 2017-08-19 PROCEDURE — G8978 MOBILITY CURRENT STATUS: HCPCS

## 2017-08-19 PROCEDURE — 85025 COMPLETE CBC W/AUTO DIFF WBC: CPT

## 2017-08-19 RX ORDER — PSEUDOEPHEDRINE HCL 30 MG
100 TABLET ORAL 2 TIMES DAILY
Qty: 60 CAPSULE | Refills: 0 | Status: SHIPPED | OUTPATIENT
Start: 2017-08-19 | End: 2017-08-22 | Stop reason: DRUGHIGH

## 2017-08-19 RX ORDER — MORPHINE SULFATE 15 MG/1
15 TABLET, FILM COATED, EXTENDED RELEASE ORAL EVERY 12 HOURS SCHEDULED
Qty: 14 TABLET | Refills: 0 | Status: SHIPPED | OUTPATIENT
Start: 2017-08-19 | End: 2018-02-13 | Stop reason: ALTCHOICE

## 2017-08-19 RX ORDER — SENNOSIDES 8.6 MG
1300 CAPSULE ORAL NIGHTLY
Status: ON HOLD | COMMUNITY
End: 2017-08-19 | Stop reason: HOSPADM

## 2017-08-19 RX ORDER — OXYCODONE HYDROCHLORIDE AND ACETAMINOPHEN 5; 325 MG/1; MG/1
1-2 TABLET ORAL EVERY 4 HOURS PRN
Qty: 80 TABLET | Refills: 0 | Status: SHIPPED | OUTPATIENT
Start: 2017-08-19 | End: 2017-08-26

## 2017-08-19 RX ADMIN — FLUTICASONE PROPIONATE 2 SPRAY: 50 SPRAY, METERED NASAL at 09:03

## 2017-08-19 RX ADMIN — INSULIN LISPRO 4 UNITS: 100 INJECTION, SOLUTION INTRAVENOUS; SUBCUTANEOUS at 11:51

## 2017-08-19 RX ADMIN — FUROSEMIDE 40 MG: 40 TABLET ORAL at 09:01

## 2017-08-19 RX ADMIN — VITAMIN D, TAB 1000IU (100/BT) 1000 UNITS: 25 TAB at 09:01

## 2017-08-19 RX ADMIN — GABAPENTIN 300 MG: 300 CAPSULE ORAL at 09:01

## 2017-08-19 RX ADMIN — DOCUSATE SODIUM 100 MG: 100 CAPSULE, LIQUID FILLED ORAL at 09:01

## 2017-08-19 RX ADMIN — METOPROLOL TARTRATE 50 MG: 50 TABLET ORAL at 09:01

## 2017-08-19 RX ADMIN — MORPHINE SULFATE 15 MG: 15 TABLET, FILM COATED, EXTENDED RELEASE ORAL at 09:01

## 2017-08-19 RX ADMIN — PNEUMOCOCCAL 13-VALENT CONJUGATE VACCINE 0.5 ML: 2.2; 2.2; 2.2; 2.2; 2.2; 4.4; 2.2; 2.2; 2.2; 2.2; 2.2; 2.2; 2.2 INJECTION, SUSPENSION INTRAMUSCULAR at 09:02

## 2017-08-19 RX ADMIN — LISINOPRIL 40 MG: 40 TABLET ORAL at 09:01

## 2017-08-19 RX ADMIN — HYDRALAZINE HYDROCHLORIDE 50 MG: 50 TABLET ORAL at 09:01

## 2017-08-19 RX ADMIN — DEXAMETHASONE SODIUM PHOSPHATE 6 MG: 10 INJECTION INTRAMUSCULAR; INTRAVENOUS at 04:05

## 2017-08-19 RX ADMIN — OXYCODONE HYDROCHLORIDE AND ACETAMINOPHEN 2 TABLET: 5; 325 TABLET ORAL at 00:56

## 2017-08-19 RX ADMIN — AMLODIPINE BESYLATE 10 MG: 10 TABLET ORAL at 09:01

## 2017-08-19 RX ADMIN — OXYCODONE HYDROCHLORIDE 5 MG: 5 TABLET ORAL at 04:18

## 2017-08-19 RX ADMIN — SODIUM CHLORIDE: 9 INJECTION, SOLUTION INTRAVENOUS at 03:53

## 2017-08-19 RX ADMIN — CEFAZOLIN 3 G: 1 INJECTION, POWDER, FOR SOLUTION INTRAMUSCULAR; INTRAVENOUS at 04:18

## 2017-08-19 RX ADMIN — MOMETASONE FUROATE AND FORMOTEROL FUMARATE DIHYDRATE 2 PUFF: 100; 5 AEROSOL RESPIRATORY (INHALATION) at 10:09

## 2017-08-19 RX ADMIN — DEXAMETHASONE SODIUM PHOSPHATE 6 MG: 10 INJECTION INTRAMUSCULAR; INTRAVENOUS at 11:51

## 2017-08-19 RX ADMIN — TIOTROPIUM BROMIDE INHALATION SPRAY 2 PUFF: 3.12 SPRAY, METERED RESPIRATORY (INHALATION) at 10:08

## 2017-08-19 RX ADMIN — INSULIN LISPRO 6 UNITS: 100 INJECTION, SOLUTION INTRAVENOUS; SUBCUTANEOUS at 09:04

## 2017-08-19 RX ADMIN — CYANOCOBALAMIN TAB 1000 MCG 1000 MCG: 1000 TAB at 09:01

## 2017-08-19 ASSESSMENT — PAIN DESCRIPTION - FREQUENCY
FREQUENCY: CONTINUOUS
FREQUENCY: CONTINUOUS
FREQUENCY: INTERMITTENT

## 2017-08-19 ASSESSMENT — PAIN DESCRIPTION - PAIN TYPE
TYPE: SURGICAL PAIN

## 2017-08-19 ASSESSMENT — PAIN SCALES - GENERAL
PAINLEVEL_OUTOF10: 3
PAINLEVEL_OUTOF10: 4
PAINLEVEL_OUTOF10: 6
PAINLEVEL_OUTOF10: 6
PAINLEVEL_OUTOF10: 3
PAINLEVEL_OUTOF10: 0
PAINLEVEL_OUTOF10: 4

## 2017-08-19 ASSESSMENT — PAIN DESCRIPTION - ONSET
ONSET: ON-GOING
ONSET: ON-GOING

## 2017-08-19 ASSESSMENT — PAIN DESCRIPTION - ORIENTATION
ORIENTATION: INNER;MID
ORIENTATION: LOWER;MID
ORIENTATION: LOWER
ORIENTATION: LOWER

## 2017-08-19 ASSESSMENT — PAIN DESCRIPTION - PROGRESSION: CLINICAL_PROGRESSION: GRADUALLY IMPROVING

## 2017-08-19 ASSESSMENT — PAIN DESCRIPTION - LOCATION
LOCATION: BACK

## 2017-08-19 ASSESSMENT — PAIN DESCRIPTION - DESCRIPTORS
DESCRIPTORS: CONSTANT
DESCRIPTORS: CONSTANT
DESCRIPTORS: ACHING;DULL

## 2017-08-20 ENCOUNTER — CARE COORDINATION (OUTPATIENT)
Dept: CASE MANAGEMENT | Age: 72
End: 2017-08-20

## 2017-08-21 ENCOUNTER — TELEPHONE (OUTPATIENT)
Dept: PHARMACY | Facility: CLINIC | Age: 72
End: 2017-08-21

## 2017-08-22 RX ORDER — HYDRALAZINE HYDROCHLORIDE 50 MG/1
100 TABLET, FILM COATED ORAL 2 TIMES DAILY
COMMUNITY

## 2017-08-22 RX ORDER — DOCUSATE SODIUM 100 MG/1
100 CAPSULE, LIQUID FILLED ORAL 2 TIMES DAILY PRN
COMMUNITY
End: 2018-02-13 | Stop reason: ALTCHOICE

## 2017-08-22 RX ORDER — ATORVASTATIN CALCIUM 10 MG/1
10 TABLET, FILM COATED ORAL EVERY OTHER DAY
COMMUNITY

## 2017-08-24 ENCOUNTER — CARE COORDINATION (OUTPATIENT)
Dept: CASE MANAGEMENT | Age: 72
End: 2017-08-24

## 2017-08-29 ENCOUNTER — CARE COORDINATION (OUTPATIENT)
Dept: CASE MANAGEMENT | Age: 72
End: 2017-08-29

## 2018-02-13 ENCOUNTER — APPOINTMENT (OUTPATIENT)
Dept: CT IMAGING | Age: 73
DRG: 291 | End: 2018-02-13
Payer: MEDICARE

## 2018-02-13 ENCOUNTER — HOSPITAL ENCOUNTER (INPATIENT)
Age: 73
LOS: 8 days | Discharge: NURSING, REHAB OR LONG-TERM CARE | DRG: 291 | End: 2018-02-21
Attending: EMERGENCY MEDICINE | Admitting: INTERNAL MEDICINE
Payer: MEDICARE

## 2018-02-13 ENCOUNTER — APPOINTMENT (OUTPATIENT)
Dept: GENERAL RADIOLOGY | Age: 73
DRG: 291 | End: 2018-02-13
Payer: MEDICARE

## 2018-02-13 DIAGNOSIS — J96.01 ACUTE RESPIRATORY FAILURE WITH HYPOXIA (HCC): Primary | ICD-10-CM

## 2018-02-13 DIAGNOSIS — J44.1 COPD EXACERBATION (HCC): ICD-10-CM

## 2018-02-13 DIAGNOSIS — I50.9 CONGESTIVE HEART FAILURE, UNSPECIFIED CONGESTIVE HEART FAILURE CHRONICITY, UNSPECIFIED CONGESTIVE HEART FAILURE TYPE: ICD-10-CM

## 2018-02-13 PROBLEM — I50.43 CHF (CONGESTIVE HEART FAILURE), NYHA CLASS I, ACUTE ON CHRONIC, COMBINED (HCC): Status: ACTIVE | Noted: 2018-02-13

## 2018-02-13 LAB
ABSOLUTE EOS #: 0 K/UL (ref 0–0.4)
ABSOLUTE IMMATURE GRANULOCYTE: ABNORMAL K/UL (ref 0–0.3)
ABSOLUTE LYMPH #: 0.9 K/UL (ref 1–4.8)
ABSOLUTE MONO #: 0.8 K/UL (ref 0.2–0.8)
ALBUMIN SERPL-MCNC: 4.2 G/DL (ref 3.5–5.2)
ALBUMIN/GLOBULIN RATIO: NORMAL (ref 1–2.5)
ALP BLD-CCNC: 63 U/L (ref 40–129)
ALT SERPL-CCNC: 20 U/L (ref 5–41)
ANION GAP SERPL CALCULATED.3IONS-SCNC: 14 MMOL/L (ref 9–17)
AST SERPL-CCNC: 15 U/L
BASOPHILS # BLD: 1 % (ref 0–2)
BASOPHILS ABSOLUTE: 0.1 K/UL (ref 0–0.2)
BILIRUB SERPL-MCNC: 0.66 MG/DL (ref 0.3–1.2)
BILIRUBIN DIRECT: 0.16 MG/DL
BILIRUBIN URINE: NEGATIVE
BILIRUBIN, INDIRECT: 0.5 MG/DL (ref 0–1)
BNP INTERPRETATION: ABNORMAL
BUN BLDV-MCNC: 18 MG/DL (ref 8–23)
BUN/CREAT BLD: 17 (ref 9–20)
CALCIUM SERPL-MCNC: 8.9 MG/DL (ref 8.6–10.4)
CHLORIDE BLD-SCNC: 100 MMOL/L (ref 98–107)
CO2: 30 MMOL/L (ref 20–31)
COLOR: YELLOW
COMMENT UA: NORMAL
CREAT SERPL-MCNC: 1.09 MG/DL (ref 0.7–1.2)
D-DIMER QUANTITATIVE: 1.21 MG/L FEU
DIFFERENTIAL TYPE: ABNORMAL
EKG ATRIAL RATE: 85 BPM
EKG Q-T INTERVAL: 392 MS
EKG QRS DURATION: 104 MS
EKG QTC CALCULATION (BAZETT): 457 MS
EKG R AXIS: -8 DEGREES
EKG T AXIS: 40 DEGREES
EKG VENTRICULAR RATE: 82 BPM
EOSINOPHILS RELATIVE PERCENT: 0 % (ref 1–4)
FIO2: 21
GFR AFRICAN AMERICAN: >60 ML/MIN
GFR NON-AFRICAN AMERICAN: >60 ML/MIN
GFR SERPL CREATININE-BSD FRML MDRD: ABNORMAL ML/MIN/{1.73_M2}
GFR SERPL CREATININE-BSD FRML MDRD: ABNORMAL ML/MIN/{1.73_M2}
GLOBULIN: NORMAL G/DL (ref 1.5–3.8)
GLUCOSE BLD-MCNC: 146 MG/DL (ref 70–99)
GLUCOSE BLD-MCNC: 169 MG/DL (ref 75–110)
GLUCOSE URINE: NEGATIVE
HCT VFR BLD CALC: 43 % (ref 41–53)
HEMOGLOBIN: 13.6 G/DL (ref 13.5–17.5)
IMMATURE GRANULOCYTES: ABNORMAL %
INR BLD: 1.1
KETONES, URINE: NEGATIVE
LEUKOCYTE ESTERASE, URINE: NEGATIVE
LYMPHOCYTES # BLD: 8 % (ref 24–44)
MCH RBC QN AUTO: 29.3 PG (ref 26–34)
MCHC RBC AUTO-ENTMCNC: 31.5 G/DL (ref 31–37)
MCV RBC AUTO: 93.1 FL (ref 80–100)
MONOCYTES # BLD: 7 % (ref 1–7)
MYOGLOBIN: 61 NG/ML (ref 28–72)
NEGATIVE BASE EXCESS, ART: ABNORMAL (ref 0–2)
NITRITE, URINE: NEGATIVE
NRBC AUTOMATED: ABNORMAL PER 100 WBC
O2 DEVICE/FLOW/%: ABNORMAL
PARTIAL THROMBOPLASTIN TIME: 25.3 SEC (ref 23–31)
PATIENT TEMP: 37
PDW BLD-RTO: 16.7 % (ref 11.5–14.5)
PH UA: 6 (ref 5–8)
PLATELET # BLD: 285 K/UL (ref 130–400)
PLATELET ESTIMATE: ABNORMAL
PMV BLD AUTO: 8.1 FL (ref 6–12)
POC HCO3: 31.5 MMOL/L (ref 22–27)
POC O2 SATURATION: 84 %
POC PCO2 TEMP: ABNORMAL MM HG
POC PCO2: 44 MM HG (ref 32–45)
POC PH TEMP: ABNORMAL
POC PH: 7.47 (ref 7.35–7.45)
POC PO2 TEMP: ABNORMAL MM HG
POC PO2: 46 MM HG (ref 75–95)
POSITIVE BASE EXCESS, ART: 8 (ref 0–2)
POTASSIUM SERPL-SCNC: 4.1 MMOL/L (ref 3.7–5.3)
PRO-BNP: 1764 PG/ML
PROTEIN UA: NEGATIVE
PROTHROMBIN TIME: 11.1 SEC (ref 9.7–11.6)
RBC # BLD: 4.62 M/UL (ref 4.5–5.9)
RBC # BLD: ABNORMAL 10*6/UL
SEG NEUTROPHILS: 84 % (ref 36–66)
SEGMENTED NEUTROPHILS ABSOLUTE COUNT: 9.4 K/UL (ref 1.8–7.7)
SODIUM BLD-SCNC: 144 MMOL/L (ref 135–144)
SPECIFIC GRAVITY UA: 1.01 (ref 1–1.03)
TCO2 (CALC), ART: 33 MMOL/L (ref 23–28)
TOTAL PROTEIN: 7.7 G/DL (ref 6.4–8.3)
TROPONIN INTERP: NORMAL
TROPONIN T: <0.03 NG/ML
TURBIDITY: CLEAR
URINE HGB: NEGATIVE
UROBILINOGEN, URINE: NORMAL
WBC # BLD: 11.2 K/UL (ref 3.5–11)
WBC # BLD: ABNORMAL 10*3/UL

## 2018-02-13 PROCEDURE — 84484 ASSAY OF TROPONIN QUANT: CPT

## 2018-02-13 PROCEDURE — 6370000000 HC RX 637 (ALT 250 FOR IP): Performed by: INTERNAL MEDICINE

## 2018-02-13 PROCEDURE — 94150 VITAL CAPACITY TEST: CPT

## 2018-02-13 PROCEDURE — 2060000000 HC ICU INTERMEDIATE R&B

## 2018-02-13 PROCEDURE — 83874 ASSAY OF MYOGLOBIN: CPT

## 2018-02-13 PROCEDURE — 94664 DEMO&/EVAL PT USE INHALER: CPT

## 2018-02-13 PROCEDURE — 2700000000 HC OXYGEN THERAPY PER DAY

## 2018-02-13 PROCEDURE — 94640 AIRWAY INHALATION TREATMENT: CPT

## 2018-02-13 PROCEDURE — 80076 HEPATIC FUNCTION PANEL: CPT

## 2018-02-13 PROCEDURE — 85379 FIBRIN DEGRADATION QUANT: CPT

## 2018-02-13 PROCEDURE — 96374 THER/PROPH/DIAG INJ IV PUSH: CPT

## 2018-02-13 PROCEDURE — 2580000003 HC RX 258: Performed by: INTERNAL MEDICINE

## 2018-02-13 PROCEDURE — 83880 ASSAY OF NATRIURETIC PEPTIDE: CPT

## 2018-02-13 PROCEDURE — 93005 ELECTROCARDIOGRAM TRACING: CPT

## 2018-02-13 PROCEDURE — 82803 BLOOD GASES ANY COMBINATION: CPT

## 2018-02-13 PROCEDURE — 85730 THROMBOPLASTIN TIME PARTIAL: CPT

## 2018-02-13 PROCEDURE — 82947 ASSAY GLUCOSE BLOOD QUANT: CPT

## 2018-02-13 PROCEDURE — 6360000004 HC RX CONTRAST MEDICATION: Performed by: NURSE PRACTITIONER

## 2018-02-13 PROCEDURE — B32T1ZZ COMPUTERIZED TOMOGRAPHY (CT SCAN) OF LEFT PULMONARY ARTERY USING LOW OSMOLAR CONTRAST: ICD-10-PCS | Performed by: RADIOLOGY

## 2018-02-13 PROCEDURE — 6360000002 HC RX W HCPCS: Performed by: EMERGENCY MEDICINE

## 2018-02-13 PROCEDURE — 99223 1ST HOSP IP/OBS HIGH 75: CPT | Performed by: INTERNAL MEDICINE

## 2018-02-13 PROCEDURE — B32S1ZZ COMPUTERIZED TOMOGRAPHY (CT SCAN) OF RIGHT PULMONARY ARTERY USING LOW OSMOLAR CONTRAST: ICD-10-PCS | Performed by: RADIOLOGY

## 2018-02-13 PROCEDURE — 81003 URINALYSIS AUTO W/O SCOPE: CPT

## 2018-02-13 PROCEDURE — 36600 WITHDRAWAL OF ARTERIAL BLOOD: CPT

## 2018-02-13 PROCEDURE — 85610 PROTHROMBIN TIME: CPT

## 2018-02-13 PROCEDURE — 85025 COMPLETE CBC W/AUTO DIFF WBC: CPT

## 2018-02-13 PROCEDURE — 2580000003 HC RX 258: Performed by: NURSE PRACTITIONER

## 2018-02-13 PROCEDURE — 71045 X-RAY EXAM CHEST 1 VIEW: CPT

## 2018-02-13 PROCEDURE — 6360000002 HC RX W HCPCS: Performed by: NURSE PRACTITIONER

## 2018-02-13 PROCEDURE — 71260 CT THORAX DX C+: CPT

## 2018-02-13 PROCEDURE — 94761 N-INVAS EAR/PLS OXIMETRY MLT: CPT

## 2018-02-13 PROCEDURE — 80048 BASIC METABOLIC PNL TOTAL CA: CPT

## 2018-02-13 PROCEDURE — 99285 EMERGENCY DEPT VISIT HI MDM: CPT

## 2018-02-13 RX ORDER — DEXTROSE MONOHYDRATE 50 MG/ML
100 INJECTION, SOLUTION INTRAVENOUS PRN
Status: DISCONTINUED | OUTPATIENT
Start: 2018-02-13 | End: 2018-02-21 | Stop reason: HOSPADM

## 2018-02-13 RX ORDER — 0.9 % SODIUM CHLORIDE 0.9 %
50 INTRAVENOUS SOLUTION INTRAVENOUS ONCE
Status: COMPLETED | OUTPATIENT
Start: 2018-02-13 | End: 2018-02-13

## 2018-02-13 RX ORDER — ALBUTEROL SULFATE 90 UG/1
2 AEROSOL, METERED RESPIRATORY (INHALATION)
Status: DISCONTINUED | OUTPATIENT
Start: 2018-02-13 | End: 2018-02-13

## 2018-02-13 RX ORDER — FLUTICASONE PROPIONATE 50 MCG
2 SPRAY, SUSPENSION (ML) NASAL DAILY
Status: DISCONTINUED | OUTPATIENT
Start: 2018-02-13 | End: 2018-02-21 | Stop reason: HOSPADM

## 2018-02-13 RX ORDER — GABAPENTIN 300 MG/1
300 CAPSULE ORAL 2 TIMES DAILY
Status: DISCONTINUED | OUTPATIENT
Start: 2018-02-13 | End: 2018-02-21 | Stop reason: HOSPADM

## 2018-02-13 RX ORDER — FUROSEMIDE 10 MG/ML
80 INJECTION INTRAMUSCULAR; INTRAVENOUS ONCE
Status: COMPLETED | OUTPATIENT
Start: 2018-02-13 | End: 2018-02-13

## 2018-02-13 RX ORDER — SODIUM CHLORIDE 0.9 % (FLUSH) 0.9 %
10 SYRINGE (ML) INJECTION
Status: COMPLETED | OUTPATIENT
Start: 2018-02-13 | End: 2018-02-13

## 2018-02-13 RX ORDER — LISINOPRIL 40 MG/1
40 TABLET ORAL DAILY
Status: DISCONTINUED | OUTPATIENT
Start: 2018-02-13 | End: 2018-02-15

## 2018-02-13 RX ORDER — DEXTROSE MONOHYDRATE 25 G/50ML
12.5 INJECTION, SOLUTION INTRAVENOUS PRN
Status: DISCONTINUED | OUTPATIENT
Start: 2018-02-13 | End: 2018-02-21 | Stop reason: HOSPADM

## 2018-02-13 RX ORDER — IPRATROPIUM BROMIDE AND ALBUTEROL SULFATE 2.5; .5 MG/3ML; MG/3ML
1 SOLUTION RESPIRATORY (INHALATION)
Status: DISCONTINUED | OUTPATIENT
Start: 2018-02-13 | End: 2018-02-21 | Stop reason: HOSPADM

## 2018-02-13 RX ORDER — SODIUM CHLORIDE 0.9 % (FLUSH) 0.9 %
10 SYRINGE (ML) INJECTION EVERY 12 HOURS SCHEDULED
Status: DISCONTINUED | OUTPATIENT
Start: 2018-02-13 | End: 2018-02-21 | Stop reason: HOSPADM

## 2018-02-13 RX ORDER — ALBUTEROL SULFATE 90 UG/1
2 AEROSOL, METERED RESPIRATORY (INHALATION) EVERY 6 HOURS PRN
Status: DISCONTINUED | OUTPATIENT
Start: 2018-02-13 | End: 2018-02-13

## 2018-02-13 RX ORDER — AMLODIPINE BESYLATE 10 MG/1
10 TABLET ORAL DAILY
Status: DISCONTINUED | OUTPATIENT
Start: 2018-02-13 | End: 2018-02-16

## 2018-02-13 RX ORDER — NITROGLYCERIN 0.4 MG/1
0.4 TABLET SUBLINGUAL EVERY 5 MIN PRN
Status: DISCONTINUED | OUTPATIENT
Start: 2018-02-13 | End: 2018-02-21 | Stop reason: HOSPADM

## 2018-02-13 RX ORDER — ATORVASTATIN CALCIUM 10 MG/1
10 TABLET, FILM COATED ORAL EVERY OTHER DAY
Status: DISCONTINUED | OUTPATIENT
Start: 2018-02-13 | End: 2018-02-21 | Stop reason: HOSPADM

## 2018-02-13 RX ORDER — NICOTINE POLACRILEX 4 MG
15 LOZENGE BUCCAL PRN
Status: DISCONTINUED | OUTPATIENT
Start: 2018-02-13 | End: 2018-02-21 | Stop reason: HOSPADM

## 2018-02-13 RX ORDER — ONDANSETRON 2 MG/ML
4 INJECTION INTRAMUSCULAR; INTRAVENOUS EVERY 6 HOURS PRN
Status: DISCONTINUED | OUTPATIENT
Start: 2018-02-13 | End: 2018-02-21 | Stop reason: HOSPADM

## 2018-02-13 RX ORDER — ACETAMINOPHEN 325 MG/1
650 TABLET ORAL EVERY 4 HOURS PRN
Status: DISCONTINUED | OUTPATIENT
Start: 2018-02-13 | End: 2018-02-21 | Stop reason: HOSPADM

## 2018-02-13 RX ORDER — SODIUM CHLORIDE 0.9 % (FLUSH) 0.9 %
10 SYRINGE (ML) INJECTION PRN
Status: DISCONTINUED | OUTPATIENT
Start: 2018-02-13 | End: 2018-02-21 | Stop reason: HOSPADM

## 2018-02-13 RX ORDER — HYDROCODONE BITARTRATE AND ACETAMINOPHEN 5; 325 MG/1; MG/1
1 TABLET ORAL EVERY 4 HOURS PRN
Status: DISCONTINUED | OUTPATIENT
Start: 2018-02-13 | End: 2018-02-21 | Stop reason: HOSPADM

## 2018-02-13 RX ORDER — ALBUTEROL SULFATE 2.5 MG/3ML
5 SOLUTION RESPIRATORY (INHALATION)
Status: DISCONTINUED | OUTPATIENT
Start: 2018-02-13 | End: 2018-02-13

## 2018-02-13 RX ORDER — HYDRALAZINE HYDROCHLORIDE 50 MG/1
100 TABLET, FILM COATED ORAL 2 TIMES DAILY
Status: DISCONTINUED | OUTPATIENT
Start: 2018-02-13 | End: 2018-02-21 | Stop reason: HOSPADM

## 2018-02-13 RX ADMIN — Medication 10 ML: at 13:17

## 2018-02-13 RX ADMIN — GABAPENTIN 300 MG: 300 CAPSULE ORAL at 20:59

## 2018-02-13 RX ADMIN — INSULIN LISPRO 2 UNITS: 100 INJECTION, SOLUTION INTRAVENOUS; SUBCUTANEOUS at 21:02

## 2018-02-13 RX ADMIN — ALBUTEROL SULFATE 5 MG: 5 SOLUTION RESPIRATORY (INHALATION) at 14:14

## 2018-02-13 RX ADMIN — IOPAMIDOL 100 ML: 755 INJECTION, SOLUTION INTRAVENOUS at 13:16

## 2018-02-13 RX ADMIN — ALBUTEROL SULFATE 5 MG: 5 SOLUTION RESPIRATORY (INHALATION) at 14:30

## 2018-02-13 RX ADMIN — MOMETASONE FUROATE AND FORMOTEROL FUMARATE DIHYDRATE 2 PUFF: 100; 5 AEROSOL RESPIRATORY (INHALATION) at 21:19

## 2018-02-13 RX ADMIN — FUROSEMIDE 80 MG: 10 INJECTION, SOLUTION INTRAMUSCULAR; INTRAVENOUS at 15:25

## 2018-02-13 RX ADMIN — HYDRALAZINE HYDROCHLORIDE 100 MG: 25 TABLET ORAL at 20:59

## 2018-02-13 RX ADMIN — Medication 10 ML: at 20:59

## 2018-02-13 RX ADMIN — SODIUM CHLORIDE 50 ML: 0.9 INJECTION, SOLUTION INTRAVENOUS at 13:16

## 2018-02-13 RX ADMIN — METOPROLOL TARTRATE 50 MG: 25 TABLET ORAL at 20:59

## 2018-02-13 ASSESSMENT — ENCOUNTER SYMPTOMS
SINUS PRESSURE: 0
COLOR CHANGE: 0
VOMITING: 0
NAUSEA: 0
COUGH: 0
SHORTNESS OF BREATH: 1
ABDOMINAL PAIN: 0
SORE THROAT: 0
DIARRHEA: 0
PHOTOPHOBIA: 0
RHINORRHEA: 0
EYE PAIN: 0

## 2018-02-13 NOTE — ED PROVIDER NOTES
[facility] ED  eMERGENCY dEPARTMENT eNCOUnter      Pt Name: Dameon Hayes  MRN: [id number]  [place] 1945  Date of evaluation: 2/13/2018  Provider: Jaylene Gan NP    CHIEF COMPLAINT       Chief Complaint   Patient presents with    Shortness of Breath         HISTORY OF PRESENT ILLNESS  (Location/Symptom, Timing/Onset, Context/Setting, Quality, Duration, Modifying Factors, Severity.)   Morris Carrasquillo SR is a 81 y.o. male who presents to the emergency department via private auto for SOB. onset was in January, 2018. States at the time of onset he was also having palpitations. He has a 30 day holter monitor in place per his cardiologist. States on 2/1/18 he was started on Eliquis due to new onset a-fib. Denies fever, chills, cough, edema. Rates his pain 0/10 at this time. States the SOB has been getting progressively worse and he can only ambulate short distances before becoming winded. Denies pain. He has a history of COPD. Nursing Notes were reviewed. ALLERGIES     Influenza vaccines    CURRENT MEDICATIONS       Previous Medications    ALBUTEROL SULFATE HFA (PROAIR HFA) 108 (90 BASE) MCG/ACT INHALER    Inhale 2 puffs into the lungs every 6 hours as needed for Wheezing    AMLODIPINE (NORVASC) 10 MG TABLET    Take 10 mg by mouth daily.     APIXABAN (ELIQUIS) 5 MG TABS TABLET    Take by mouth daily    ATORVASTATIN (LIPITOR) 10 MG TABLET    Take 10 mg by mouth every other day    BUDESONIDE-FORMOTEROL (SYMBICORT) 80-4.5 MCG/ACT AERO    Inhale 2 puffs into the lungs 2 times daily    CYANOCOBALAMIN 1000 MCG TABLET    Take 1,000 mcg by mouth daily    FLUTICASONE (FLONASE) 50 MCG/ACT NASAL SPRAY    2 sprays by Nasal route daily    FUROSEMIDE (LASIX) 40 MG TABLET    Take 40 mg by mouth 2 times daily    GABAPENTIN (NEURONTIN) 300 MG CAPSULE    Take 300 mg by mouth 2 times daily     HYDRALAZINE (APRESOLINE) 50 MG TABLET    Take 100 mg by mouth 2 times daily    LISINOPRIL (PRINIVIL;ZESTRIL) 40 MG TABLET    Take 40 mg by mouth daily. METFORMIN (GLUCOPHAGE) 500 MG TABLET    Take 850 mg by mouth 2 times daily (with meals)     METOPROLOL (LOPRESSOR) 50 MG TABLET    Take 50 mg by mouth 2 times daily. NITROGLYCERIN (NITROSTAT) 0.4 MG SL TABLET    Place 0.4 mg under the tongue every 5 minutes as needed for Chest pain. TIOTROPIUM BROMIDE MONOHYDRATE (SPIRIVA RESPIMAT IN)    Inhale 2 puffs into the lungs every morning    VITAMIN D (CHOLECALCIFEROL) 1000 UNIT TABS TABLET    Take 2,000 Units by mouth daily       PAST MEDICAL HISTORY         Diagnosis Date    Atrial fibrillation ([facility] [address]     Chronic airway obstruction, not elsewhere classified 5/6/2014    Coronary atherosclerosis of unspecified type of vessel, native or graft 5/6/2014    x 1 stent  (Dr. Sunny Tucker)    Essential hypertension, benign 5/6/2014    Examination of participant in clinical trial 8/23/11    End date 3/25/15    Hypercholesterolemia 5/6/2014    MI (myocardial infarction) 2009    Osteoarthrosis, unspecified whether generalized or localized, lower leg 5/6/2014    Type II or unspecified type diabetes mellitus without mention of complication, not stated as uncontrolled 5/6/2014       SURGICAL HISTORY           Procedure Laterality Date    CARPAL TUNNEL RELEASE Right     COLONOSCOPY      CORONARY ANGIOPLASTY  11/4/2011    heart cath    CORONARY ANGIOPLASTY WITH STENT PLACEMENT  8/22/11    stent in proximal RCA - Dr Conchis Mabry Bilateral     cataracts    LAMINECTOMY N/A 8/18/2017    LUMBAR LAMINECTOMY L3-5 - TRUPTI performed by Cara Chaudhry MD at [address] [facility]  08/18/2017         FAMILY HISTORY           Problem Relation Age of Onset    Cancer Mother     Other Father      NonHodgkins Lymphoma     Family Status   Relation Status    Mother     Father         SOCIAL HISTORY      reports that he quit smoking about 32 years ago.  He has never used smokeless tobacco. He reports that he does not drink alcohol or use drugs. REVIEW OF SYSTEMS    (2-9 systems for level 4, 10 or more for level 5)     Review of Systems   Constitutional: Positive for activity change and fatigue. Negative for appetite change, chills, diaphoresis and fever. HENT: Negative for congestion, ear discharge, ear pain, postnasal drip, rhinorrhea, sinus pressure and sore throat. Eyes: Negative for photophobia, pain and visual disturbance. Respiratory: Positive for shortness of breath. Negative for cough. Cardiovascular: Positive for leg swelling. Negative for chest pain and palpitations. Gastrointestinal: Negative for abdominal pain, diarrhea, nausea and vomiting. Genitourinary: Negative for dysuria. Musculoskeletal: Negative for arthralgias, myalgias, neck pain and neck stiffness. Skin: Negative for color change and rash. Neurological: Negative for dizziness, facial asymmetry, speech difficulty, weakness, light-headedness, numbness and headaches. Except as noted above the remainder of the review of systems was reviewed and negative. PHYSICAL EXAM    (up to 7 for level 4, 8 or more for level 5)     ED Triage Vitals [02/13/18 1123]   BP Temp Temp Source Pulse Resp SpO2 Height Weight   119/60 97.8 °F (36.6 °C) Oral 98 -- (!) 77 % 5' 9\" (1.753 m) (!) 312 lb (141.5 kg)     Physical Exam   Constitutional: He is oriented to person, place, and time. He appears well-developed and well-nourished. No distress. HENT:   Mouth/Throat: Oropharynx is clear and moist.   Eyes: Conjunctivae and EOM are normal. Pupils are equal, round, and reactive to light. Neck: Normal range of motion. Neck supple. Cardiovascular: Regular rhythm and normal heart sounds. Tachycardia present. Pulmonary/Chest: Effort normal. Tachypnea noted. No respiratory distress. He has decreased breath sounds. He has no wheezes. He has no rales. Abdominal: Soft. Bowel sounds are normal. He exhibits no distension. There is no tenderness.  There is no rebound

## 2018-02-13 NOTE — PROGRESS NOTES
Pharmacy will assess for completion of ordered procedure and administration of IV contrast media. Serum creatinine: 1.09 mg/dL 02/13/18 1140  Estimated creatinine clearance: 88 mL/min  Clinical Pharmacy Note  Metformin-IV Contrast Interaction Monitoring  Serum Creatinine Follow-up    Galindo Mckeon is a 81 y.o. male     Recent Labs      02/13/18   1140   CREATININE  1.09     Estimated Creatinine Clearance: 88 mL/min (based on SCr of 1.09 mg/dL). It is recommended that METFORMIN be temporarily discontinued in patients undergoing radiologic studies in which intravascular iodinated contrast media are utilized. DATE:  Isovue -370 100ml  given on 2/13/18 at 1314. Authorizing Physician for original order: Terese Lara (metformin 850mg bid with meals. )    Assessment/Plan:  1. Metformin has been held > 48 hours from time of IV contrast dye administration. 2.  Will re-evaluate GFR in 48 hours and restart if appropriate. If eGFR is greater than 30, it should be safe to resume metformin. Note: previous  recommendations stated the following as safe to administer: sCr  < 1.5 mg/dL in males, or < 1.4 mg/dL in females.       Joan Russell RPh  2/13/2018  3:33 PM

## 2018-02-14 LAB
ANION GAP SERPL CALCULATED.3IONS-SCNC: 11 MMOL/L (ref 9–17)
BUN BLDV-MCNC: 15 MG/DL (ref 8–23)
BUN/CREAT BLD: 15 (ref 9–20)
CALCIUM SERPL-MCNC: 9.1 MG/DL (ref 8.6–10.4)
CHLORIDE BLD-SCNC: 101 MMOL/L (ref 98–107)
CO2: 31 MMOL/L (ref 20–31)
CREAT SERPL-MCNC: 0.99 MG/DL (ref 0.7–1.2)
GFR AFRICAN AMERICAN: >60 ML/MIN
GFR NON-AFRICAN AMERICAN: >60 ML/MIN
GFR SERPL CREATININE-BSD FRML MDRD: ABNORMAL ML/MIN/{1.73_M2}
GFR SERPL CREATININE-BSD FRML MDRD: ABNORMAL ML/MIN/{1.73_M2}
GLUCOSE BLD-MCNC: 137 MG/DL (ref 75–110)
GLUCOSE BLD-MCNC: 145 MG/DL (ref 75–110)
GLUCOSE BLD-MCNC: 145 MG/DL (ref 75–110)
GLUCOSE BLD-MCNC: 146 MG/DL (ref 75–110)
GLUCOSE BLD-MCNC: 174 MG/DL (ref 70–99)
HCT VFR BLD CALC: 41.2 % (ref 41–53)
HEMOGLOBIN: 12.8 G/DL (ref 13.5–17.5)
LV EF: 38 %
LVEF MODALITY: NORMAL
MAGNESIUM: 2.4 MG/DL (ref 1.6–2.6)
MCH RBC QN AUTO: 29 PG (ref 26–34)
MCHC RBC AUTO-ENTMCNC: 30.9 G/DL (ref 31–37)
MCV RBC AUTO: 93.7 FL (ref 80–100)
NRBC AUTOMATED: ABNORMAL PER 100 WBC
PDW BLD-RTO: 16.9 % (ref 11.5–14.5)
PLATELET # BLD: 286 K/UL (ref 130–400)
PMV BLD AUTO: 8 FL (ref 6–12)
POTASSIUM SERPL-SCNC: 4.1 MMOL/L (ref 3.7–5.3)
RBC # BLD: 4.4 M/UL (ref 4.5–5.9)
SODIUM BLD-SCNC: 143 MMOL/L (ref 135–144)
TROPONIN INTERP: NORMAL
TROPONIN T: <0.03 NG/ML
WBC # BLD: 11 K/UL (ref 3.5–11)

## 2018-02-14 PROCEDURE — 84484 ASSAY OF TROPONIN QUANT: CPT

## 2018-02-14 PROCEDURE — 94640 AIRWAY INHALATION TREATMENT: CPT

## 2018-02-14 PROCEDURE — 36415 COLL VENOUS BLD VENIPUNCTURE: CPT

## 2018-02-14 PROCEDURE — 2700000000 HC OXYGEN THERAPY PER DAY

## 2018-02-14 PROCEDURE — 2500000003 HC RX 250 WO HCPCS: Performed by: INTERNAL MEDICINE

## 2018-02-14 PROCEDURE — 99232 SBSQ HOSP IP/OBS MODERATE 35: CPT | Performed by: INTERNAL MEDICINE

## 2018-02-14 PROCEDURE — 6370000000 HC RX 637 (ALT 250 FOR IP): Performed by: NURSE PRACTITIONER

## 2018-02-14 PROCEDURE — 83735 ASSAY OF MAGNESIUM: CPT

## 2018-02-14 PROCEDURE — 85027 COMPLETE CBC AUTOMATED: CPT

## 2018-02-14 PROCEDURE — 6360000002 HC RX W HCPCS: Performed by: INTERNAL MEDICINE

## 2018-02-14 PROCEDURE — 6370000000 HC RX 637 (ALT 250 FOR IP): Performed by: INTERNAL MEDICINE

## 2018-02-14 PROCEDURE — 93306 TTE W/DOPPLER COMPLETE: CPT

## 2018-02-14 PROCEDURE — 94760 N-INVAS EAR/PLS OXIMETRY 1: CPT

## 2018-02-14 PROCEDURE — 82947 ASSAY GLUCOSE BLOOD QUANT: CPT

## 2018-02-14 PROCEDURE — 80048 BASIC METABOLIC PNL TOTAL CA: CPT

## 2018-02-14 PROCEDURE — 2580000003 HC RX 258: Performed by: INTERNAL MEDICINE

## 2018-02-14 PROCEDURE — 2060000000 HC ICU INTERMEDIATE R&B

## 2018-02-14 PROCEDURE — 94660 CPAP INITIATION&MGMT: CPT

## 2018-02-14 RX ORDER — CHLORHEXIDINE GLUCONATE 0.12 MG/ML
15 RINSE ORAL 2 TIMES DAILY
COMMUNITY

## 2018-02-14 RX ORDER — FUROSEMIDE 10 MG/ML
40 INJECTION INTRAMUSCULAR; INTRAVENOUS 2 TIMES DAILY
Status: DISCONTINUED | OUTPATIENT
Start: 2018-02-14 | End: 2018-02-15

## 2018-02-14 RX ADMIN — Medication 10 ML: at 10:06

## 2018-02-14 RX ADMIN — FUROSEMIDE 40 MG: 10 INJECTION, SOLUTION INTRAMUSCULAR; INTRAVENOUS at 17:33

## 2018-02-14 RX ADMIN — GABAPENTIN 300 MG: 300 CAPSULE ORAL at 09:53

## 2018-02-14 RX ADMIN — LISINOPRIL 40 MG: 10 TABLET ORAL at 09:52

## 2018-02-14 RX ADMIN — INSULIN LISPRO 2 UNITS: 100 INJECTION, SOLUTION INTRAVENOUS; SUBCUTANEOUS at 22:24

## 2018-02-14 RX ADMIN — HYDRALAZINE HYDROCHLORIDE 100 MG: 25 TABLET ORAL at 22:22

## 2018-02-14 RX ADMIN — AMIODARONE HYDROCHLORIDE 0.5 MG/MIN: 1.8 INJECTION, SOLUTION INTRAVENOUS at 17:33

## 2018-02-14 RX ADMIN — FLUTICASONE PROPIONATE 2 SPRAY: 50 SPRAY, METERED NASAL at 09:54

## 2018-02-14 RX ADMIN — IPRATROPIUM BROMIDE AND ALBUTEROL SULFATE 1 AMPULE: .5; 3 SOLUTION RESPIRATORY (INHALATION) at 05:18

## 2018-02-14 RX ADMIN — METOPROLOL TARTRATE 75 MG: 50 TABLET ORAL at 22:21

## 2018-02-14 RX ADMIN — GABAPENTIN 300 MG: 300 CAPSULE ORAL at 22:21

## 2018-02-14 RX ADMIN — FUROSEMIDE 40 MG: 10 INJECTION, SOLUTION INTRAMUSCULAR; INTRAVENOUS at 10:06

## 2018-02-14 RX ADMIN — AMIODARONE HYDROCHLORIDE 1 MG/MIN: 1.8 INJECTION, SOLUTION INTRAVENOUS at 10:28

## 2018-02-14 RX ADMIN — MOMETASONE FUROATE AND FORMOTEROL FUMARATE DIHYDRATE 2 PUFF: 200; 5 AEROSOL RESPIRATORY (INHALATION) at 20:18

## 2018-02-14 RX ADMIN — HYDRALAZINE HYDROCHLORIDE 100 MG: 25 TABLET ORAL at 09:53

## 2018-02-14 RX ADMIN — INSULIN LISPRO 3 UNITS: 100 INJECTION, SOLUTION INTRAVENOUS; SUBCUTANEOUS at 09:54

## 2018-02-14 RX ADMIN — APIXABAN 5 MG: 5 TABLET, FILM COATED ORAL at 09:53

## 2018-02-14 RX ADMIN — MOMETASONE FUROATE AND FORMOTEROL FUMARATE DIHYDRATE 2 PUFF: 100; 5 AEROSOL RESPIRATORY (INHALATION) at 09:44

## 2018-02-14 RX ADMIN — AMIODARONE HYDROCHLORIDE 150 MG: 1.5 INJECTION, SOLUTION INTRAVENOUS at 09:50

## 2018-02-14 RX ADMIN — INSULIN LISPRO 3 UNITS: 100 INJECTION, SOLUTION INTRAVENOUS; SUBCUTANEOUS at 17:33

## 2018-02-14 RX ADMIN — AMLODIPINE BESYLATE 10 MG: 5 TABLET ORAL at 09:53

## 2018-02-14 NOTE — CONSULTS
Inhalation BID    fluticasone  2 spray Nasal Daily    gabapentin  300 mg Oral BID    hydrALAZINE  100 mg Oral BID    lisinopril  40 mg Oral Daily    sodium chloride flush  10 mL Intravenous 2 times per day    insulin lispro  0-18 Units Subcutaneous TID WC    insulin lispro  0-9 Units Subcutaneous Nightly     ipratropium-albuterol, nitroGLYCERIN, sodium chloride flush, acetaminophen, HYDROcodone 5 mg - acetaminophen, magnesium hydroxide, ondansetron, glucose, dextrose, glucagon (rDNA), dextrose  IV Drips/Infusions   Amiodarone 1 mg/min (02/14/18 1028)    Followed by   [facility] Amiodarone      dextrose       Home Medications  Prescriptions Prior to Admission: chlorhexidine (PERIDEX) 0.12 % solution, Take 15 mLs by mouth 2 times daily  ARTIFICIAL TEAR SOLUTION OP, Place into both eyes 4 times daily as needed (dry eyes)  apixaban (ELIQUIS) 5 MG TABS tablet, Take by mouth daily  vitamin D (CHOLECALCIFEROL) 1000 UNIT TABS tablet, Take 2,000 Units by mouth daily  atorvastatin (LIPITOR) 10 MG tablet, Take 10 mg by mouth every other day  hydrALAZINE (APRESOLINE) 50 MG tablet, Take 100 mg by mouth 2 times daily  Tiotropium Bromide Monohydrate (SPIRIVA RESPIMAT IN), Inhale 2 puffs into the lungs every morning  furosemide (LASIX) 40 MG tablet, Take 40 mg by mouth 2 times daily  budesonide-formoterol (SYMBICORT) 80-4.5 MCG/ACT AERO, Inhale 2 puffs into the lungs 2 times daily  albuterol sulfate HFA (PROAIR HFA) 108 (90 BASE) MCG/ACT inhaler, Inhale 2 puffs into the lungs every 6 hours as needed for Wheezing  cyanocobalamin 1000 MCG tablet, Take 1,000 mcg by mouth daily  fluticasone (FLONASE) 50 MCG/ACT nasal spray, 2 sprays by Nasal route daily  amLODIPine (NORVASC) 10 MG tablet, Take 10 mg by mouth daily. gabapentin (NEURONTIN) 300 MG capsule, Take 300 mg by mouth 2 times daily   lisinopril (PRINIVIL;ZESTRIL) 40 MG tablet, Take 40 mg by mouth daily.   metFORMIN (GLUCOPHAGE) 850 MG tablet, Take 850 mg by mouth 2 times daily (with meals)   metoprolol (LOPRESSOR) 50 MG tablet, Take 50 mg by mouth 2 times daily. nitroGLYCERIN (NITROSTAT) 0.4 MG SL tablet, Place 0.4 mg under the tongue every 5 minutes as needed for Chest pain. Allergies    Influenza vaccines  Social History     Social History     Social History    Marital status: Single     Spouse name: N/A    Number of children: N/A    Years of education: N/A     Occupational History    Not on file. Social History Main Topics    Smoking status: Former Smoker     Quit date: 2/27/1985    Smokeless tobacco: Never Used    Alcohol use No    Drug use: No    Sexual activity: Not on file     Other Topics Concern    Not on file     Social History Narrative    No narrative on file     Family History          Problem Relation Age of Onset    Cancer Mother     Other Father      NonHodgkins Lymphoma     ROS - 11 systems   General Denies any fever or chills  HEENT Denies any diplopia, tinnitus or vertigo  Resp positive for  dyspnea and wheezing  Cardiac + palpitations  GI Denies any melena, hematochezia, hematemesis or pyrosis   Denies any frequency, urgency, hesitancy or incontinence  Heme Denies bruising or bleeding easily  Endocrine Denies any history of diabetes or thyroid disease  Neuro Denies any focal motor or sensory deficits  Psychiatric Denies anxiety, depression, suicidal ideation  Skin Denies rashes, itching, open sores    Vitals    BP (!) 120/56   Pulse 83   Temp 97.7 °F (36.5 °C) (Oral)   Resp 24   Ht 5' 9\" (1.753 m)   Wt (!) 312 lb (141.5 kg)   SpO2 90%   BMI 46.07 kg/m²   Body mass index is 46.07 kg/m².   I/O      Intake/Output Summary (Last 24 hours) at 02/14/18 1548  Last data filed at 02/13/18 1805   Gross per 24 hour   Intake                0 ml   Output              300 ml   Net             -300 ml     I/O last 3 completed shifts:  In: -   Out: 300 [Urine:300]   Patient Vitals for the past 96 hrs (Last 3 readings):   Weight   02/13/18 1123 (!) 312 lb (141.5 kg)     Exam   General Appearance Awake, alert, oriented, in no acute distress, family at bedside   HEENT - Head is normocephalic, atraumatic. Pupil reactive to light  Neck - Supple, symmetrical, trachea midline and Soft, trachea midline and straight  Lungs - decreased air exchange, crackles bilateral bases  Cardiovascular - Heart sounds are normal.  Regular rhythm normal rate without murmur, gallop or rub. Abdomen - Soft, nontender, nondistended, no masses or organomegaly  Neurologic - CN II-XII are grossly intact.  There are no focal motor or sensory deficits  Skin - No bruising or bleeding  Extremities - No cyanosis, clubbing or edema    Labs  - Old records and notes have been reviewed in [facility]   CBC   Lab Results   Component Value Date    WBC 11.0 02/14/2018    RBC 4.40 02/14/2018    HGB 12.8 02/14/2018    HCT 41.2 02/14/2018     02/14/2018     11/04/2011    MCV 93.7 02/14/2018    MCH 29.0 02/14/2018    MCHC 30.9 02/14/2018    RDW 16.9 02/14/2018    LYMPHOPCT 8 02/13/2018    MONOPCT 7 02/13/2018    BASOPCT 1 02/13/2018    MONOSABS 0.80 02/13/2018    LYMPHSABS 0.90 02/13/2018    EOSABS 0.00 02/13/2018    BASOSABS 0.10 02/13/2018    DIFFTYPE NOT REPORTED 02/13/2018     BMP Lab Results   Component Value Date     02/14/2018    K 4.1 02/14/2018     02/14/2018    CO2 31 02/14/2018    BUN 15 02/14/2018    CREATININE 0.99 02/14/2018    GLUCOSE 174 02/14/2018    CALCIUM 9.1 02/14/2018    MG 2.4 02/14/2018     LFTS  Lab Results   Component Value Date    ALKPHOS 63 02/13/2018    ALT 20 02/13/2018    AST 15 02/13/2018    PROT 7.7 02/13/2018    BILITOT 0.66 02/13/2018    BILIDIR 0.16 02/13/2018    IBILI 0.50 02/13/2018    LABALBU 4.2 02/13/2018     PTT  Lab Results   Component Value Date    APTT 25.3 02/13/2018     INR   Lab Results   Component Value Date    INR 1.1 02/13/2018    INR 1.0 08/04/2017    PROTIME 11.1 02/13/2018    PROTIME 10.0 08/04/2017       Radiology    CXR       CT

## 2018-02-14 NOTE — CARE COORDINATION
Case Management Initial Discharge Plan  Ирина Marie SR,         Readmission Risk              Readmission Risk:        11.5       Age 74 or Greater:  1    Admitted from SNF or Requires Paid or Family Care:  0    Currently has CHF,COPD,ARF,CRI,or is on dialysis:  4    Takes more than 5 Prescription Medications:  4    Takes Digoxin,Insulin,Anticoagulants,Narcotics or ASA/Plavix:  [address] in Past 12 Months:  0    On Disability:  0    Patient Considers own Health:  2.5            Met with:patient or spouse/SO to discuss discharge plans. Information verified: address, contacts, phone number, , insurance Yes  PCP: Amara Parker DO  Date of last visit: 2018    Insurance Provider: aTnmay Ashley  Discharge Planning  Current Residence:   Private Residence   Living Arrangements:  Spouse/Significant Other   Home has 1  Stories/ 0 stairs to climb  Support Systems:  None  Current Services PTA:    Supplier:   Patient able to perform ADL's:Independent  DME used to aid ambulation prior to admission: none/during admission     Potential Assistance Needed:  N/A    Pharmacy: [facility]   Potential Assistance Purchasing Medications:  No  Does patient want to participate in local refill/ meds to beds program?  No    Patient agreeable to home care: No  [place] of choice provided:  n/a      Type of Home Care Services:  [place]  Patient expects to be discharged to:  Home    Prior SNF/Rehab Placement and Facility: none  Agreeable to SNF/Rehab: No  [place] of choice provided: n/a   Evaluation: yes    Expected Discharge date:  02/15/18  Follow Up Appointment: Best Day/ Time: Tuesday AM    Transportation provider: wife   Transportation arrangements needed for discharge: No     Discharge Plan: Met with pt and spouse at bedside to discuss discharge planning. Pt is planning to discharge home no needs at discharge.          Electronically signed by GARY Varma LSW on 18 at 12:58 PM

## 2018-02-14 NOTE — CARE COORDINATION
[address] Transitions Interview     2018    Patient: Howie Mcallister SR Patient : 1945   MRN: [id number]    Reason for Admission: Acute respiratory failure, COPD exacerbation, CHF, shortness of breath   RARS: [facility] Risk Score: 15.75, CMRS 4     Spoke with: Marguerite Yip    Met with pt in his room. Writer reviewed role of CTC following discharge- v/u. Contact information provided. Agreeable to transition calls   States he live at home with his wife. 1 story home without steps  Wears hearing aid and is still quite hard of hearing   States he has a walker at home but was from back surgery last year but does not need it anymore. States he is still quite independent and drives himself. CHF and COPD zone information left with pt at bedside. Reviewed with pt     Readmission Risk  Patient Active Problem List   Diagnosis    Hypercholesterolemia    Osteoarthrosis involving lower leg    Impotence of organic origin    Coronary atherosclerosis    Other testicular hypofunction    Type 2 diabetes mellitus without complication, without long-term current use of insulin (HCC)    Essential hypertension    Chronic systolic congestive heart failure (HCC)    RAJ (obstructive sleep apnea) intolerant of cpap     Morbid obesity with BMI of 45.0-49.9, adult (HCC)    Restrictive lung disease secondary to obesity    Simple chronic bronchitis (HCC)    Stenosis, spinal, lumbar    CHF (congestive heart failure), NYHA class I, acute on chronic, combined [facility])       Inpatient Assessment  Care Transitions Summary    Care Transitions Inpatient Review  Medication Review  Do you have all of your prescriptions and are they filled?:  Yes   Are you able to afford your medications?:  Yes  How often do you have difficulty taking your medications?:  I always take them as prescribed.   Housing Review  Who do you live with?:  Partner/Spouse/SO  Are you an active caregiver in your home?:  No  Social Support  Do you have a ?:  No  Do you have a [facility]?:  No  Durable Medical Equipment  Patient DME:  Jack Dupont  Functional Review  Ability to seek help/take action for Emergent/Urgent situations i.e. fire, crime, inclement weather or health crisis. :  Independent  Ability handle personal hygiene needs (bathing/dressing/grooming): Independent  Ability to manage medications: Independent  Ability to prepare food:  Independent  Ability to maintain home (clean home, laundry): Independent  Ability to drive and/or has transportation:  Independent  Ability to do shopping:  Independent  Ability to manage finances:   Independent  Is patient able to live independently?:  Yes  Hearing and Vision  Visual Impairment:  Visual impairment (Glasses/contacts)  Hearing Impairment:  Wears hearing aids  Care Transitions Interventions         Follow Up  Future Appointments  Date Time Provider Luci Abbey   2/23/2018 3:15 PM Chanelle Ramos MD Resp Spec [facility]   4/16/2018 9:00 AM SCHEDULE, [facility] RESPIRATORY SPEC Resp Spec [id number]   4/16/2018 9:30 AM Chanelle Ramos  [address] [facility]  Health Maintenance Due   Topic Date Due    A1C test (Diabetic or Prediabetic)  09/08/1957    Diabetic microalbuminuria test  09/08/1965       Vanessa Recinos RN

## 2018-02-14 NOTE — H&P
[address] [facility] [address]                 [address] [place] [address]                               HISTORY AND PHYSICAL    PATIENT NAME: Demond Toledo                  :        1945  MED REC NO:   [id number]                             ROOM:       1018  ACCOUNT NO:   [de-identified]                           ADMIT DATE: 2018  PROVIDER:     Debra Mckeon    CHIEF COMPLAINT:  Shortness of breath. HISTORY OF PRESENT ILLNESS:  This is a 74-year-old white male, who has been  having multiple medical problems, has been having more shortness of breath  for the last few weeks. The patient's symptoms got worse and presented to  the emergency room. The patient was admitted for CHF and exacerbation of  COPD. According to the patient, he was seeing his cardiologist.  He saw  him on first of this month and was started on Eliquis for anticoagulation  even though according to him he had the atrial fibrillation for many years  since . The patient also mentioned that he is going to see him  tomorrow but his symptoms got worse so the patient presented to the  emergency room. Currently, the patient states that after getting the  nebulizer treatment, he is feeling much better. PAST MEDICAL HISTORY:  Positive for atrial fibrillation, COPD, coronary  artery disease, essential hypertension, hypercholesterolemia,  osteoarthritis, type 2 diabetes mellitus. ALLERGIES:  INFLUENZA VACCINE and according to the patient few years back  when he had the FLU VACCINE he ended up in the hospital and since then he  was not taking the flu shots.     MEDICATIONS:  Albuterol, amlodipine 10 mg daily, Eliquis 5 mg daily,  Lipitor 10 mg daily, cyanocobalamin 1000 mcg daily, Symbicort 80/4.5 two  puffs b.i.d., Flonase two sprays daily, gabapentin 300 mg two times a day,  hydralazine 100 mg by mouth 2 times a day, lisinopril 40 mg by mouth,  metformin 850 mg twice a day, metoprolol 50

## 2018-02-14 NOTE — PROGRESS NOTES
Pharmacy Accuracy Service Medication History Note    The patient's list of current home medications has been reviewed. Source(s) of information: patient, [facility] pharmacy    Based on information provided by the above source(s), I have updated the patient's home med list as described below. Please review the ACTION REQUESTED BY PHYSICIAN section of this note below for any discrepancies on current hospital orders. I changed or updated the following medications on the patient's home medication list:  Added · Chlorhexidine rinse 15mL BID  · Artificial tears QID prn dry eye         PHYSICIAN ACTION REQUESTED  Discrepancies on current hospital orders that need to be addressed by a physician:    Medication Action Requested        Remaining home med list is ready to be reconciled         Please feel free to call me with any questions about this encounter. Thank you.     Gretel Baez PharmD  Pharmacy Medication Accuracy Review Service  Phone:  [phone]  Fax: [phone]      Electronically signed by Gretel Baez [facility] on 2/14/2018 at 3:24 PM

## 2018-02-15 ENCOUNTER — APPOINTMENT (OUTPATIENT)
Dept: GENERAL RADIOLOGY | Age: 73
DRG: 291 | End: 2018-02-15
Payer: MEDICARE

## 2018-02-15 LAB
ABSOLUTE EOS #: 0.1 K/UL (ref 0–0.4)
ABSOLUTE IMMATURE GRANULOCYTE: ABNORMAL K/UL (ref 0–0.3)
ABSOLUTE LYMPH #: 0.9 K/UL (ref 1–4.8)
ABSOLUTE MONO #: 1 K/UL (ref 0.2–0.8)
ANION GAP SERPL CALCULATED.3IONS-SCNC: 11 MMOL/L (ref 9–17)
BASOPHILS # BLD: 0 % (ref 0–2)
BASOPHILS ABSOLUTE: 0 K/UL (ref 0–0.2)
BUN BLDV-MCNC: 15 MG/DL (ref 8–23)
BUN BLDV-MCNC: 15 MG/DL (ref 8–23)
BUN/CREAT BLD: 14 (ref 9–20)
CALCIUM SERPL-MCNC: 8.6 MG/DL (ref 8.6–10.4)
CHLORIDE BLD-SCNC: 102 MMOL/L (ref 98–107)
CO2: 31 MMOL/L (ref 20–31)
CREAT SERPL-MCNC: 1.02 MG/DL (ref 0.7–1.2)
CREAT SERPL-MCNC: 1.05 MG/DL (ref 0.7–1.2)
DIFFERENTIAL TYPE: ABNORMAL
EOSINOPHILS RELATIVE PERCENT: 1 % (ref 1–4)
GFR AFRICAN AMERICAN: >60 ML/MIN
GFR AFRICAN AMERICAN: >60 ML/MIN
GFR NON-AFRICAN AMERICAN: >60 ML/MIN
GFR NON-AFRICAN AMERICAN: >60 ML/MIN
GFR SERPL CREATININE-BSD FRML MDRD: ABNORMAL ML/MIN/{1.73_M2}
GFR SERPL CREATININE-BSD FRML MDRD: ABNORMAL ML/MIN/{1.73_M2}
GFR SERPL CREATININE-BSD FRML MDRD: NORMAL ML/MIN/{1.73_M2}
GFR SERPL CREATININE-BSD FRML MDRD: NORMAL ML/MIN/{1.73_M2}
GLUCOSE BLD-MCNC: 127 MG/DL (ref 75–110)
GLUCOSE BLD-MCNC: 137 MG/DL (ref 75–110)
GLUCOSE BLD-MCNC: 140 MG/DL (ref 75–110)
GLUCOSE BLD-MCNC: 152 MG/DL (ref 70–99)
GLUCOSE BLD-MCNC: 160 MG/DL (ref 75–110)
HCT VFR BLD CALC: 37.8 % (ref 41–53)
HEMOGLOBIN: 12.1 G/DL (ref 13.5–17.5)
IMMATURE GRANULOCYTES: ABNORMAL %
LYMPHOCYTES # BLD: 9 % (ref 24–44)
MCH RBC QN AUTO: 29.4 PG (ref 26–34)
MCHC RBC AUTO-ENTMCNC: 31.9 G/DL (ref 31–37)
MCV RBC AUTO: 92 FL (ref 80–100)
MONOCYTES # BLD: 10 % (ref 1–7)
NRBC AUTOMATED: ABNORMAL PER 100 WBC
PDW BLD-RTO: 16.4 % (ref 11.5–14.5)
PLATELET # BLD: 273 K/UL (ref 130–400)
PLATELET ESTIMATE: ABNORMAL
PMV BLD AUTO: 8 FL (ref 6–12)
POTASSIUM SERPL-SCNC: 3.9 MMOL/L (ref 3.7–5.3)
RBC # BLD: 4.11 M/UL (ref 4.5–5.9)
RBC # BLD: ABNORMAL 10*6/UL
SEG NEUTROPHILS: 80 % (ref 36–66)
SEGMENTED NEUTROPHILS ABSOLUTE COUNT: 8 K/UL (ref 1.8–7.7)
SODIUM BLD-SCNC: 144 MMOL/L (ref 135–144)
WBC # BLD: 10 K/UL (ref 3.5–11)
WBC # BLD: ABNORMAL 10*3/UL

## 2018-02-15 PROCEDURE — 94660 CPAP INITIATION&MGMT: CPT

## 2018-02-15 PROCEDURE — 80048 BASIC METABOLIC PNL TOTAL CA: CPT

## 2018-02-15 PROCEDURE — 2500000003 HC RX 250 WO HCPCS: Performed by: INTERNAL MEDICINE

## 2018-02-15 PROCEDURE — 2060000000 HC ICU INTERMEDIATE R&B

## 2018-02-15 PROCEDURE — 99232 SBSQ HOSP IP/OBS MODERATE 35: CPT | Performed by: INTERNAL MEDICINE

## 2018-02-15 PROCEDURE — 82947 ASSAY GLUCOSE BLOOD QUANT: CPT

## 2018-02-15 PROCEDURE — 6370000000 HC RX 637 (ALT 250 FOR IP): Performed by: NURSE PRACTITIONER

## 2018-02-15 PROCEDURE — 36415 COLL VENOUS BLD VENIPUNCTURE: CPT

## 2018-02-15 PROCEDURE — 71045 X-RAY EXAM CHEST 1 VIEW: CPT

## 2018-02-15 PROCEDURE — 6360000002 HC RX W HCPCS: Performed by: INTERNAL MEDICINE

## 2018-02-15 PROCEDURE — 84520 ASSAY OF UREA NITROGEN: CPT

## 2018-02-15 PROCEDURE — 85025 COMPLETE CBC W/AUTO DIFF WBC: CPT

## 2018-02-15 PROCEDURE — 6370000000 HC RX 637 (ALT 250 FOR IP): Performed by: INTERNAL MEDICINE

## 2018-02-15 PROCEDURE — 82565 ASSAY OF CREATININE: CPT

## 2018-02-15 PROCEDURE — 2580000003 HC RX 258: Performed by: INTERNAL MEDICINE

## 2018-02-15 PROCEDURE — 94640 AIRWAY INHALATION TREATMENT: CPT

## 2018-02-15 PROCEDURE — 94760 N-INVAS EAR/PLS OXIMETRY 1: CPT

## 2018-02-15 RX ORDER — FUROSEMIDE 10 MG/ML
20 INJECTION INTRAMUSCULAR; INTRAVENOUS DAILY
Status: DISCONTINUED | OUTPATIENT
Start: 2018-02-16 | End: 2018-02-16

## 2018-02-15 RX ORDER — CLONAZEPAM 0.5 MG/1
0.5 TABLET ORAL NIGHTLY PRN
Status: DISCONTINUED | OUTPATIENT
Start: 2018-02-15 | End: 2018-02-21 | Stop reason: HOSPADM

## 2018-02-15 RX ADMIN — TIOTROPIUM BROMIDE 18 MCG: 18 CAPSULE ORAL; RESPIRATORY (INHALATION) at 09:35

## 2018-02-15 RX ADMIN — INSULIN LISPRO 3 UNITS: 100 INJECTION, SOLUTION INTRAVENOUS; SUBCUTANEOUS at 17:19

## 2018-02-15 RX ADMIN — AMIODARONE HYDROCHLORIDE 0.5 MG/MIN: 50 INJECTION, SOLUTION INTRAVENOUS at 14:26

## 2018-02-15 RX ADMIN — GABAPENTIN 300 MG: 300 CAPSULE ORAL at 22:41

## 2018-02-15 RX ADMIN — FUROSEMIDE 40 MG: 10 INJECTION, SOLUTION INTRAMUSCULAR; INTRAVENOUS at 07:42

## 2018-02-15 RX ADMIN — GABAPENTIN 300 MG: 300 CAPSULE ORAL at 07:41

## 2018-02-15 RX ADMIN — AMIODARONE HYDROCHLORIDE 0.5 MG/MIN: 1.8 INJECTION, SOLUTION INTRAVENOUS at 04:29

## 2018-02-15 RX ADMIN — APIXABAN 5 MG: 5 TABLET, FILM COATED ORAL at 22:40

## 2018-02-15 RX ADMIN — MOMETASONE FUROATE AND FORMOTEROL FUMARATE DIHYDRATE 2 PUFF: 200; 5 AEROSOL RESPIRATORY (INHALATION) at 09:35

## 2018-02-15 RX ADMIN — ATORVASTATIN CALCIUM 10 MG: 10 TABLET, FILM COATED ORAL at 07:41

## 2018-02-15 RX ADMIN — METFORMIN HYDROCHLORIDE 850 MG: 850 TABLET ORAL at 17:19

## 2018-02-15 RX ADMIN — APIXABAN 5 MG: 5 TABLET, FILM COATED ORAL at 07:42

## 2018-02-15 RX ADMIN — HYDRALAZINE HYDROCHLORIDE 100 MG: 25 TABLET ORAL at 22:40

## 2018-02-15 RX ADMIN — MOMETASONE FUROATE AND FORMOTEROL FUMARATE DIHYDRATE 2 PUFF: 200; 5 AEROSOL RESPIRATORY (INHALATION) at 20:40

## 2018-02-15 RX ADMIN — CLONAZEPAM 0.5 MG: 0.5 TABLET ORAL at 22:41

## 2018-02-15 RX ADMIN — METOPROLOL TARTRATE 75 MG: 50 TABLET ORAL at 22:41

## 2018-02-15 NOTE — PROGRESS NOTES
Have reviewed pts medications with him. He admits to being noncompliant with his diet regarding diabetes, or low sodium. Guidelines reviewed. CHF handout given and encouraged adherance to recommendations. Discussed need to purchase a scale and follow daily weight checks.

## 2018-02-15 NOTE — PROGRESS NOTES
Pulmonary Critical Care Progress Note  Evelia Nixon CNP / Dr. Chung Rivera M.D. Patient seen for the follow up of CHF exacerbation/COPD    Subjective:  He tried wearing BiPAP last night and states \"I will never do that again\". His shortness of breath is unchanged. He denies significant cough. He denies chest pain. Examination:  Vitals: BP 97/63   Pulse 102   Temp 97.5 °F (36.4 °C) (Oral)   Resp 16   Ht 5' 9\" (1.753 m)   Wt (!) 312 lb (141.5 kg)   SpO2 92%   BMI 46.07 kg/m²     General appearance: alert and cooperative with exam, no signs of distress  Neck: No JVD  Lungs: Decreased air exchange, no significant wheeze  Heart: regular rate and rhythm, S1, S2 normal, no gallop  Abdomen: Soft, non tender, + BS  Extremities: no cyanosis or clubbing.  No significant edema    LABs:  CBC:   Recent Labs      02/14/18   0859  02/15/18   0634   WBC  11.0  10.0   HGB  12.8*  12.1*   HCT  41.2  37.8*   PLT  286  273     BMP:   Recent Labs      02/14/18   0859  02/15/18   0634   NA  143  144   K  4.1  3.9   CO2  31  31   BUN  15  15   CREATININE  0.99  1.05   LABGLOM  >60  >60   GLUCOSE  174*  152*     PT/INR:   Recent Labs      02/13/18   1140   PROTIME  11.1   INR  1.1     APTT:  Recent Labs      02/13/18   1140   APTT  25.3     LIVER PROFILE:  Recent Labs      02/13/18   1140   AST  15   ALT  20   LABALBU  4.2     Radiology:      Impression:  · Acute on chronic respiratory failure  · Acute On chronic systolic heart failure, EF 35-40%  · Bilateral pleural effusions R>L / atelectasis  · COPD, 35-pack-year smoking  · Mild secondary pulmonary hypertension  · Atrial fibrillation  · DM II, HLD, HTN, CAD    Recommendations:  · 2 liters/min via nasal cannula  · BiPAP for sleep and as needed  · Diuresis  · Spiriva  · Albuterol Q 4 hours prn  · Dulera 200  · IV amiodarone/cardiology consult  · LLOYD/Cardioversion planned for tomorrow per cardiology  · Labs: CBC and BMP in am  · DVT prophylaxis, on Eliquis  · Home O2

## 2018-02-15 NOTE — PROGRESS NOTES
rhythm, no murmur  Abdomen: soft, nontender, nondistended, bowel sounds present all four quadrants, no masses, hepatomegaly or splenomegaly  Extremities: no edema, redness or tenderness in the calves  Skin: no gross lesions, rashes, or induration    Assessment:        Primary Problem  <principal problem not specified>     Active Hospital Problems    Diagnosis Date Noted    CHF (congestive heart failure), NYHA class I, acute on chronic, combined ([facility] [I50.43] 02/13/2018     Past Medical History:   Diagnosis Date    Atrial fibrillation (HCC)     Chronic airway obstruction, not elsewhere classified 5/6/2014    Coronary atherosclerosis of unspecified type of vessel, native or graft 5/6/2014    x 1 stent  (Dr. Sunny Tucker)    Essential hypertension, benign 5/6/2014    Examination of participant in clinical trial 8/23/11    End date 3/25/15    Hypercholesterolemia 5/6/2014    MI (myocardial infarction) 2009    Osteoarthrosis, unspecified whether generalized or localized, lower leg 5/6/2014    Type II or unspecified type diabetes mellitus without mention of complication, not stated as uncontrolled 5/6/2014        Plan:        1. Patient is scheduled for LLOYD and subsequent cardioversion tomorrow  2. Continue current treatment  3.  All medications labs and history reviewed      Electronically signed by Tim Kemp MD on 2/15/2018 at 2:11 PM

## 2018-02-15 NOTE — PROGRESS NOTES
[facility]   Progress Note                   Date:   2/15/2018  Patient name: Monica Cyr SR  Date of admission:  2/13/2018 11:17 AM  MRN:   [id number]  YOB: 1945  PCP: J Carlos Matthews DO    Reason for Admission: CHF (congestive heart failure), NYHA class I, acute on chronic, combined ([facility] [I50.43]    Subjective:       Clinical Changes / Abnormalities: Reports feeling 90% better. No chest pain. In afib      Medications:   Scheduled Meds:   furosemide  40 mg Intravenous BID    metoprolol tartrate  75 mg Oral BID    tiotropium  18 mcg Inhalation Daily    mometasone-formoterol  2 puff Inhalation BID    amLODIPine  10 mg Oral Daily    apixaban  5 mg Oral Daily    atorvastatin  10 mg Oral Every Other Day    fluticasone  2 spray Nasal Daily    gabapentin  300 mg Oral BID    hydrALAZINE  100 mg Oral BID    lisinopril  40 mg Oral Daily    sodium chloride flush  10 mL Intravenous 2 times per day    insulin lispro  0-18 Units Subcutaneous TID WC    insulin lispro  0-9 Units Subcutaneous Nightly     Continuous Infusions:   Amiodarone 0.5 mg/min (02/15/18 0429)    dextrose       CBC:   Recent Labs      02/13/18   1140  02/14/18   0859  02/15/18   0634   WBC  11.2*  11.0  10.0   HGB  13.6  12.8*  12.1*   PLT  285  286  273     BMP:    Recent Labs      02/13/18   1140  02/14/18   0859  02/15/18   0634   NA  144  143  144   K  4.1  4.1  3.9   CL  100  101  102   CO2  30  31  31   BUN  18  15  15   CREATININE  1.09  0.99  1.05   GLUCOSE  146*  174*  152*     Hepatic:   Recent Labs      02/13/18   1140   AST  15   ALT  20   BILITOT  0.66   ALKPHOS  63     Troponin: No results for input(s): TROPONINI in the last 72 hours. BNP: No results for input(s): BNP in the last 72 hours. Lipids: No results for input(s): CHOL, HDL in the last 72 hours.     Invalid input(s): LDLCALCU  INR:   Recent Labs      02/13/18   1140   INR  1.1       Objective:   Vitals: BP 97/63   Pulse 102   Temp to BID dosing  3. Decrease IV lasix 20mg qday. 4. Continue metoprolol. Hold BP meds if SBP < 100  5. Change lisinopril to entresto 24/26mg po BID and increase dose if BP allows. 6. LLOYD/CV tomorrow. Risk and benefits discussed. 7. MUGA scan as OP in 4 weeks to evaluate for LVEF accurately.   8. Management of COPD per pulmonary    [facility]  [phone]

## 2018-02-16 ENCOUNTER — APPOINTMENT (OUTPATIENT)
Dept: GENERAL RADIOLOGY | Age: 73
DRG: 291 | End: 2018-02-16
Payer: MEDICARE

## 2018-02-16 LAB
ANION GAP SERPL CALCULATED.3IONS-SCNC: 12 MMOL/L (ref 9–17)
BUN BLDV-MCNC: 15 MG/DL (ref 8–23)
BUN/CREAT BLD: 15 (ref 9–20)
CALCIUM SERPL-MCNC: 8.7 MG/DL (ref 8.6–10.4)
CHLORIDE BLD-SCNC: 99 MMOL/L (ref 98–107)
CO2: 29 MMOL/L (ref 20–31)
CREAT SERPL-MCNC: 1.02 MG/DL (ref 0.7–1.2)
GFR AFRICAN AMERICAN: >60 ML/MIN
GFR NON-AFRICAN AMERICAN: >60 ML/MIN
GFR SERPL CREATININE-BSD FRML MDRD: ABNORMAL ML/MIN/{1.73_M2}
GFR SERPL CREATININE-BSD FRML MDRD: ABNORMAL ML/MIN/{1.73_M2}
GLUCOSE BLD-MCNC: 117 MG/DL (ref 75–110)
GLUCOSE BLD-MCNC: 138 MG/DL (ref 75–110)
GLUCOSE BLD-MCNC: 153 MG/DL (ref 70–99)
GLUCOSE BLD-MCNC: 153 MG/DL (ref 75–110)
GLUCOSE BLD-MCNC: 181 MG/DL (ref 75–110)
HCT VFR BLD CALC: 37.8 % (ref 41–53)
HEMOGLOBIN: 12.4 G/DL (ref 13.5–17.5)
MAGNESIUM: 2.4 MG/DL (ref 1.6–2.6)
MCH RBC QN AUTO: 29.9 PG (ref 26–34)
MCHC RBC AUTO-ENTMCNC: 32.8 G/DL (ref 31–37)
MCV RBC AUTO: 91 FL (ref 80–100)
NRBC AUTOMATED: ABNORMAL PER 100 WBC
PDW BLD-RTO: 15.7 % (ref 11.5–14.5)
PLATELET # BLD: 275 K/UL (ref 130–400)
PMV BLD AUTO: ABNORMAL FL (ref 6–12)
POTASSIUM SERPL-SCNC: 4.5 MMOL/L (ref 3.7–5.3)
RBC # BLD: 4.15 M/UL (ref 4.5–5.9)
SODIUM BLD-SCNC: 140 MMOL/L (ref 135–144)
WBC # BLD: 10.7 K/UL (ref 3.5–11)

## 2018-02-16 PROCEDURE — 85027 COMPLETE CBC AUTOMATED: CPT

## 2018-02-16 PROCEDURE — 6370000000 HC RX 637 (ALT 250 FOR IP): Performed by: INTERNAL MEDICINE

## 2018-02-16 PROCEDURE — 6370000000 HC RX 637 (ALT 250 FOR IP): Performed by: NURSE PRACTITIONER

## 2018-02-16 PROCEDURE — 2700000000 HC OXYGEN THERAPY PER DAY

## 2018-02-16 PROCEDURE — 2580000003 HC RX 258: Performed by: INTERNAL MEDICINE

## 2018-02-16 PROCEDURE — 99232 SBSQ HOSP IP/OBS MODERATE 35: CPT | Performed by: INTERNAL MEDICINE

## 2018-02-16 PROCEDURE — 83735 ASSAY OF MAGNESIUM: CPT

## 2018-02-16 PROCEDURE — 71045 X-RAY EXAM CHEST 1 VIEW: CPT

## 2018-02-16 PROCEDURE — 2060000000 HC ICU INTERMEDIATE R&B

## 2018-02-16 PROCEDURE — 36415 COLL VENOUS BLD VENIPUNCTURE: CPT

## 2018-02-16 PROCEDURE — 82947 ASSAY GLUCOSE BLOOD QUANT: CPT

## 2018-02-16 PROCEDURE — 6360000002 HC RX W HCPCS: Performed by: INTERNAL MEDICINE

## 2018-02-16 PROCEDURE — 80048 BASIC METABOLIC PNL TOTAL CA: CPT

## 2018-02-16 PROCEDURE — 94640 AIRWAY INHALATION TREATMENT: CPT

## 2018-02-16 PROCEDURE — 94762 N-INVAS EAR/PLS OXIMTRY CONT: CPT

## 2018-02-16 RX ORDER — METOPROLOL TARTRATE 100 MG/1
100 TABLET ORAL 2 TIMES DAILY
Status: DISCONTINUED | OUTPATIENT
Start: 2018-02-16 | End: 2018-02-21 | Stop reason: HOSPADM

## 2018-02-16 RX ORDER — AMIODARONE HYDROCHLORIDE 200 MG/1
400 TABLET ORAL 2 TIMES DAILY
Status: DISCONTINUED | OUTPATIENT
Start: 2018-02-16 | End: 2018-02-21 | Stop reason: HOSPADM

## 2018-02-16 RX ORDER — AMIODARONE HYDROCHLORIDE 200 MG/1
200 TABLET ORAL 2 TIMES DAILY
Status: DISCONTINUED | OUTPATIENT
Start: 2018-03-02 | End: 2018-02-21 | Stop reason: HOSPADM

## 2018-02-16 RX ORDER — FUROSEMIDE 10 MG/ML
40 INJECTION INTRAMUSCULAR; INTRAVENOUS 2 TIMES DAILY
Status: DISCONTINUED | OUTPATIENT
Start: 2018-02-16 | End: 2018-02-21 | Stop reason: HOSPADM

## 2018-02-16 RX ORDER — AMLODIPINE BESYLATE 2.5 MG/1
2.5 TABLET ORAL DAILY
Status: DISCONTINUED | OUTPATIENT
Start: 2018-02-16 | End: 2018-02-21 | Stop reason: HOSPADM

## 2018-02-16 RX ORDER — DIGOXIN 125 MCG
125 TABLET ORAL DAILY
Status: DISCONTINUED | OUTPATIENT
Start: 2018-02-16 | End: 2018-02-21 | Stop reason: HOSPADM

## 2018-02-16 RX ADMIN — GABAPENTIN 300 MG: 300 CAPSULE ORAL at 20:39

## 2018-02-16 RX ADMIN — AMIODARONE HYDROCHLORIDE 400 MG: 200 TABLET ORAL at 20:40

## 2018-02-16 RX ADMIN — METOPROLOL TARTRATE 100 MG: 100 TABLET ORAL at 20:39

## 2018-02-16 RX ADMIN — TIOTROPIUM BROMIDE 18 MCG: 18 CAPSULE ORAL; RESPIRATORY (INHALATION) at 08:30

## 2018-02-16 RX ADMIN — METOPROLOL TARTRATE 100 MG: 100 TABLET ORAL at 09:41

## 2018-02-16 RX ADMIN — FUROSEMIDE 40 MG: 10 INJECTION, SOLUTION INTRAMUSCULAR; INTRAVENOUS at 17:57

## 2018-02-16 RX ADMIN — INSULIN LISPRO 2 UNITS: 100 INJECTION, SOLUTION INTRAVENOUS; SUBCUTANEOUS at 20:40

## 2018-02-16 RX ADMIN — FLUTICASONE PROPIONATE 2 SPRAY: 50 SPRAY, METERED NASAL at 09:46

## 2018-02-16 RX ADMIN — Medication 10 ML: at 20:40

## 2018-02-16 RX ADMIN — HYDRALAZINE HYDROCHLORIDE 100 MG: 25 TABLET ORAL at 09:43

## 2018-02-16 RX ADMIN — METFORMIN HYDROCHLORIDE 850 MG: 850 TABLET ORAL at 17:57

## 2018-02-16 RX ADMIN — AMIODARONE HYDROCHLORIDE 0.5 MG/MIN: 50 INJECTION, SOLUTION INTRAVENOUS at 03:09

## 2018-02-16 RX ADMIN — MOMETASONE FUROATE AND FORMOTEROL FUMARATE DIHYDRATE 2 PUFF: 200; 5 AEROSOL RESPIRATORY (INHALATION) at 19:33

## 2018-02-16 RX ADMIN — SACUBITRIL AND VALSARTAN 1 TABLET: 24; 26 TABLET, FILM COATED ORAL at 20:39

## 2018-02-16 RX ADMIN — AMLODIPINE BESYLATE 2.5 MG: 5 TABLET ORAL at 10:00

## 2018-02-16 RX ADMIN — METFORMIN HYDROCHLORIDE 850 MG: 850 TABLET ORAL at 09:42

## 2018-02-16 RX ADMIN — FUROSEMIDE 40 MG: 10 INJECTION, SOLUTION INTRAMUSCULAR; INTRAVENOUS at 09:46

## 2018-02-16 RX ADMIN — SACUBITRIL AND VALSARTAN 1 TABLET: 24; 26 TABLET, FILM COATED ORAL at 09:43

## 2018-02-16 RX ADMIN — HYDRALAZINE HYDROCHLORIDE 100 MG: 25 TABLET ORAL at 20:40

## 2018-02-16 RX ADMIN — IPRATROPIUM BROMIDE AND ALBUTEROL SULFATE 1 AMPULE: .5; 3 SOLUTION RESPIRATORY (INHALATION) at 04:38

## 2018-02-16 RX ADMIN — APIXABAN 5 MG: 5 TABLET, FILM COATED ORAL at 20:39

## 2018-02-16 RX ADMIN — DIGOXIN 125 MCG: 125 TABLET ORAL at 11:15

## 2018-02-16 RX ADMIN — INSULIN LISPRO 3 UNITS: 100 INJECTION, SOLUTION INTRAVENOUS; SUBCUTANEOUS at 12:39

## 2018-02-16 RX ADMIN — AMIODARONE HYDROCHLORIDE 400 MG: 200 TABLET ORAL at 09:59

## 2018-02-16 RX ADMIN — MOMETASONE FUROATE AND FORMOTEROL FUMARATE DIHYDRATE 2 PUFF: 200; 5 AEROSOL RESPIRATORY (INHALATION) at 08:30

## 2018-02-16 RX ADMIN — APIXABAN 5 MG: 5 TABLET, FILM COATED ORAL at 09:41

## 2018-02-16 RX ADMIN — GABAPENTIN 300 MG: 300 CAPSULE ORAL at 09:42

## 2018-02-16 ASSESSMENT — PAIN SCALES - GENERAL: PAINLEVEL_OUTOF10: 0

## 2018-02-16 NOTE — PLAN OF CARE
Problem: Falls - Risk of  Goal: Absence of falls  Outcome: Ongoing  Patient is a high fall risk per falls risk assessment. Remains free from falls and injuries, call light at reach and utilized appropriately. Bed in lowest position with wheels locked and alarm armed. Personal items that are used frequently are within reach.  Up to chair with assist, no attempts to get out of bed unassisted noted or reported, falling star program and hourly rounding implemented, will continue to monitor and educate as needed

## 2018-02-16 NOTE — FLOWSHEET NOTE
The patient was sleeping. The writer gave a silent prayer of rest and comfort.      02/16/18 2228   Encounter Summary   Services provided to: Patient   Referral/Consult From: Rachael   Continue Visiting (2/16/2018)   Complexity of Encounter Low   Length of Encounter 15 minutes   Routine   Type Follow up   Assessment Sleeping

## 2018-02-16 NOTE — PROGRESS NOTES
Simple chronic bronchitis (HCC)     Stenosis, spinal, lumbar     CHF (congestive heart failure), NYHA class I, acute on chronic, combined ([facility]      Plan of Treatment:   1. Hold off on LLOYD/CV- can be done as outpatient- currently hypoxic and on significant High Flow NC. Suspect worsening CHF. 2. Change amio to PO  3. Add digoxin for better rate control   4. Increase diuresis today back to lasix 40 mg IV BID  5. On eliquis but dose is once a day. Changed to BID dosing  6. Continue metoprolol. Hold BP meds if SBP < 100  7. Continue entresto 24/26mg po BID  8. Management of COPD per pulmonary    Will follow    D/w nursing and patient in detail, all questions answered. Thank you for allowing me to participate in the care of this patient, please do not hesitate to call if you have any questions. Davidson Davis DO, [address] [facility]  [facility]. [employer]  [phone]

## 2018-02-16 NOTE — PROGRESS NOTES
Spoke to Dr Tayo Diggs regarding pt decline in ability to maintain SaO2. Pt had been on nasal canula 3-6lpm until 0430 when nurse PCT checked vitals with SaO2 of 75%. Pt refusing to wear BiPAP. Pt placed on NRB 15lpm SaO2 improved to 100%. Breathsounds noted clear in upper lobes and fine crackles in the bases. RR 26 br/min and slightly labored. Pt given PRN Duoneb tx with no change in breathsounds or tachypnea. Pt placed on venturi mask 55% FiO2 at 14lpm with SaO2 measuring 92%. Per Dr Benito Corona, pt to remain on venturi mask 55% unless SAO2 continues to decline pt is to wear wear high flow nasal canula.

## 2018-02-16 NOTE — PROGRESS NOTES
prn  · Dulera 200  · Amiodarone switched to PO  · LLOYD/Cardioversion on hold secondary to hypoxia/respiratory distress  · Labs: CBC and BMP in am, BNP  · DVT prophylaxis, on Eliquis  · Home O2 evaluation prior to discharge  · Sleep studies as outpatient  · Incentive spirometry every hour while awake  · Will follow with you    Electronically signed by     Huey Tsang CNP on 2/16/2018 at 1:20 PM  Patient was seen under the supervision of Dr. Taylor Bush and Sleep Medicine,    [facility] [employer] [facility]: [phone]

## 2018-02-17 ENCOUNTER — APPOINTMENT (OUTPATIENT)
Dept: GENERAL RADIOLOGY | Age: 73
DRG: 291 | End: 2018-02-17
Payer: MEDICARE

## 2018-02-17 LAB
ABSOLUTE EOS #: 0 K/UL (ref 0–0.4)
ABSOLUTE IMMATURE GRANULOCYTE: ABNORMAL K/UL (ref 0–0.3)
ABSOLUTE LYMPH #: 0.9 K/UL (ref 1–4.8)
ABSOLUTE MONO #: 1.2 K/UL (ref 0.2–0.8)
ANION GAP SERPL CALCULATED.3IONS-SCNC: 11 MMOL/L (ref 9–17)
BASOPHILS # BLD: 1 % (ref 0–2)
BASOPHILS ABSOLUTE: 0.1 K/UL (ref 0–0.2)
BNP INTERPRETATION: ABNORMAL
BUN BLDV-MCNC: 22 MG/DL (ref 8–23)
BUN/CREAT BLD: 22 (ref 9–20)
CALCIUM SERPL-MCNC: 8.8 MG/DL (ref 8.6–10.4)
CHLORIDE BLD-SCNC: 100 MMOL/L (ref 98–107)
CO2: 31 MMOL/L (ref 20–31)
CREAT SERPL-MCNC: 1 MG/DL (ref 0.7–1.2)
DIFFERENTIAL TYPE: ABNORMAL
EOSINOPHILS RELATIVE PERCENT: 0 % (ref 1–4)
FIO2: 50
FIO2: 80
GFR AFRICAN AMERICAN: >60 ML/MIN
GFR NON-AFRICAN AMERICAN: >60 ML/MIN
GFR SERPL CREATININE-BSD FRML MDRD: ABNORMAL ML/MIN/{1.73_M2}
GFR SERPL CREATININE-BSD FRML MDRD: ABNORMAL ML/MIN/{1.73_M2}
GLUCOSE BLD-MCNC: 137 MG/DL (ref 75–110)
GLUCOSE BLD-MCNC: 140 MG/DL (ref 75–110)
GLUCOSE BLD-MCNC: 148 MG/DL (ref 75–110)
GLUCOSE BLD-MCNC: 153 MG/DL (ref 70–99)
HCO3: 35.7 MMOL/L (ref 24–32)
HCT VFR BLD CALC: 39 % (ref 41–53)
HEMOGLOBIN: 12.3 G/DL (ref 13.5–17.5)
IMMATURE GRANULOCYTES: ABNORMAL %
LYMPHOCYTES # BLD: 7 % (ref 24–44)
MAGNESIUM: 2.3 MG/DL (ref 1.6–2.6)
MCH RBC QN AUTO: 29.3 PG (ref 26–34)
MCHC RBC AUTO-ENTMCNC: 31.4 G/DL (ref 31–37)
MCV RBC AUTO: 93.3 FL (ref 80–100)
MONOCYTES # BLD: 10 % (ref 1–7)
NEGATIVE BASE EXCESS, ART: ABNORMAL (ref 0–2)
NEGATIVE BASE EXCESS: ABNORMAL (ref 0–2)
NRBC AUTOMATED: ABNORMAL PER 100 WBC
O2 DEVICE/FLOW/%: ABNORMAL
O2 DEVICE/FLOW/%: ABNORMAL
O2 SATURATION: 92 %
PATIENT TEMP: ABNORMAL
PATIENT TEMP: ABNORMAL
PCO2: 66 MM HG
PDW BLD-RTO: 16.9 % (ref 11.5–14.5)
PH: 7.34
PLATELET # BLD: 261 K/UL (ref 130–400)
PLATELET ESTIMATE: ABNORMAL
PMV BLD AUTO: 8.2 FL (ref 6–12)
PO2: 69 MM HG
POC HCO3: 35.5 MMOL/L (ref 22–27)
POC O2 SATURATION: 92 %
POC PCO2 TEMP: ABNORMAL MM HG
POC PCO2 TEMP: ABNORMAL MM HG
POC PCO2: 64 MM HG (ref 32–45)
POC PH TEMP: ABNORMAL
POC PH TEMP: ABNORMAL
POC PH: 7.35 (ref 7.35–7.45)
POC PO2 TEMP: ABNORMAL MM HG
POC PO2 TEMP: ABNORMAL MM HG
POC PO2: 69 MM HG (ref 75–95)
POC TCO2: 38 MMOL/L
POSITIVE BASE EXCESS, ART: 10 (ref 0–2)
POSITIVE BASE EXCESS: 10 (ref 0–2)
POTASSIUM SERPL-SCNC: 4.3 MMOL/L (ref 3.7–5.3)
PRO-BNP: 2044 PG/ML
RBC # BLD: 4.18 M/UL (ref 4.5–5.9)
RBC # BLD: ABNORMAL 10*6/UL
SEG NEUTROPHILS: 82 % (ref 36–66)
SEGMENTED NEUTROPHILS ABSOLUTE COUNT: 10.1 K/UL (ref 1.8–7.7)
SODIUM BLD-SCNC: 142 MMOL/L (ref 135–144)
TCO2 (CALC), ART: 37 MMOL/L (ref 23–28)
WBC # BLD: 12.4 K/UL (ref 3.5–11)
WBC # BLD: ABNORMAL 10*3/UL

## 2018-02-17 PROCEDURE — 36600 WITHDRAWAL OF ARTERIAL BLOOD: CPT

## 2018-02-17 PROCEDURE — 94640 AIRWAY INHALATION TREATMENT: CPT

## 2018-02-17 PROCEDURE — 82947 ASSAY GLUCOSE BLOOD QUANT: CPT

## 2018-02-17 PROCEDURE — 80048 BASIC METABOLIC PNL TOTAL CA: CPT

## 2018-02-17 PROCEDURE — 2700000000 HC OXYGEN THERAPY PER DAY

## 2018-02-17 PROCEDURE — 6370000000 HC RX 637 (ALT 250 FOR IP): Performed by: INTERNAL MEDICINE

## 2018-02-17 PROCEDURE — 6370000000 HC RX 637 (ALT 250 FOR IP): Performed by: NURSE PRACTITIONER

## 2018-02-17 PROCEDURE — 94761 N-INVAS EAR/PLS OXIMETRY MLT: CPT

## 2018-02-17 PROCEDURE — 2580000003 HC RX 258: Performed by: INTERNAL MEDICINE

## 2018-02-17 PROCEDURE — 2060000000 HC ICU INTERMEDIATE R&B

## 2018-02-17 PROCEDURE — 82805 BLOOD GASES W/O2 SATURATION: CPT

## 2018-02-17 PROCEDURE — 99232 SBSQ HOSP IP/OBS MODERATE 35: CPT | Performed by: INTERNAL MEDICINE

## 2018-02-17 PROCEDURE — 83880 ASSAY OF NATRIURETIC PEPTIDE: CPT

## 2018-02-17 PROCEDURE — 83735 ASSAY OF MAGNESIUM: CPT

## 2018-02-17 PROCEDURE — 82803 BLOOD GASES ANY COMBINATION: CPT

## 2018-02-17 PROCEDURE — 71045 X-RAY EXAM CHEST 1 VIEW: CPT

## 2018-02-17 PROCEDURE — 6360000002 HC RX W HCPCS: Performed by: INTERNAL MEDICINE

## 2018-02-17 PROCEDURE — 36415 COLL VENOUS BLD VENIPUNCTURE: CPT

## 2018-02-17 PROCEDURE — 85025 COMPLETE CBC W/AUTO DIFF WBC: CPT

## 2018-02-17 RX ADMIN — SACUBITRIL AND VALSARTAN 1 TABLET: 24; 26 TABLET, FILM COATED ORAL at 10:37

## 2018-02-17 RX ADMIN — AMIODARONE HYDROCHLORIDE 400 MG: 200 TABLET ORAL at 10:36

## 2018-02-17 RX ADMIN — APIXABAN 5 MG: 5 TABLET, FILM COATED ORAL at 10:37

## 2018-02-17 RX ADMIN — Medication 10 ML: at 21:01

## 2018-02-17 RX ADMIN — FLUTICASONE PROPIONATE 2 SPRAY: 50 SPRAY, METERED NASAL at 10:38

## 2018-02-17 RX ADMIN — MOMETASONE FUROATE AND FORMOTEROL FUMARATE DIHYDRATE 2 PUFF: 200; 5 AEROSOL RESPIRATORY (INHALATION) at 09:23

## 2018-02-17 RX ADMIN — SACUBITRIL AND VALSARTAN 1 TABLET: 24; 26 TABLET, FILM COATED ORAL at 21:02

## 2018-02-17 RX ADMIN — METOPROLOL TARTRATE 100 MG: 100 TABLET ORAL at 10:46

## 2018-02-17 RX ADMIN — AMLODIPINE BESYLATE 2.5 MG: 5 TABLET ORAL at 10:37

## 2018-02-17 RX ADMIN — ATORVASTATIN CALCIUM 10 MG: 10 TABLET, FILM COATED ORAL at 10:37

## 2018-02-17 RX ADMIN — Medication 10 ML: at 10:38

## 2018-02-17 RX ADMIN — HYDRALAZINE HYDROCHLORIDE 100 MG: 25 TABLET ORAL at 10:37

## 2018-02-17 RX ADMIN — METFORMIN HYDROCHLORIDE 850 MG: 850 TABLET ORAL at 10:37

## 2018-02-17 RX ADMIN — FUROSEMIDE 40 MG: 10 INJECTION, SOLUTION INTRAMUSCULAR; INTRAVENOUS at 10:37

## 2018-02-17 RX ADMIN — HYDRALAZINE HYDROCHLORIDE 100 MG: 25 TABLET ORAL at 21:02

## 2018-02-17 RX ADMIN — DIGOXIN 125 MCG: 125 TABLET ORAL at 10:37

## 2018-02-17 RX ADMIN — FUROSEMIDE 40 MG: 10 INJECTION, SOLUTION INTRAMUSCULAR; INTRAVENOUS at 17:55

## 2018-02-17 RX ADMIN — CLONAZEPAM 0.5 MG: 0.5 TABLET ORAL at 21:09

## 2018-02-17 RX ADMIN — AMIODARONE HYDROCHLORIDE 400 MG: 200 TABLET ORAL at 21:02

## 2018-02-17 RX ADMIN — GABAPENTIN 300 MG: 300 CAPSULE ORAL at 10:37

## 2018-02-17 RX ADMIN — GABAPENTIN 300 MG: 300 CAPSULE ORAL at 21:02

## 2018-02-17 RX ADMIN — METOPROLOL TARTRATE 100 MG: 100 TABLET ORAL at 21:02

## 2018-02-17 RX ADMIN — TIOTROPIUM BROMIDE 18 MCG: 18 CAPSULE ORAL; RESPIRATORY (INHALATION) at 09:23

## 2018-02-17 RX ADMIN — APIXABAN 5 MG: 5 TABLET, FILM COATED ORAL at 21:01

## 2018-02-17 ASSESSMENT — PAIN SCALES - GENERAL: PAINLEVEL_OUTOF10: 0

## 2018-02-17 NOTE — PLAN OF CARE
Problem: OXYGENATION/RESPIRATORY FUNCTION  Goal: Patient will maintain patent airway  Outcome: Ongoing  Patient's airway continues to be patent this shift, high flow o2 per nasal cannula remains in place along with continuous pulse ox, will quickly desat when cannula is not directly in nose, writer explained to patient the benefit of wearing a CPAP at night and that there are medications to help him relax while the mask is in place, patient seemed agreeable to wearing the mask tomorrow night provided he receive some kind of medication for relaxation, SpO2 90 - 92% on high flow. Will continue to monitor for patent airway and intervene as needed.

## 2018-02-17 NOTE — PROGRESS NOTES
Pulmonary Critical Care Progress Note  Chandan Fitzgerald CNP / Dr. Favian Bray M.D. Patient seen for the follow up of CHF exacerbation/COPD    Subjective:  He has some confusion today, he is quite sleepy. His speech at this time is garbled. He is visibly short of breath, prolonged expiratory phase/respiratory effort. Examination:  Vitals: BP (!) 117/56   Pulse 104   Temp 98.1 °F (36.7 °C) (Oral)   Resp 20   Ht 5' 9\" (1.753 m)   Wt (!) 312 lb (141.5 kg)   SpO2 91%   BMI 46.07 kg/m²     High flow 80%/35L    General appearance: Sleepy/confused, family at bedside  Neck: No JVD  Lungs: Decreased air exchange, + crackles + wheeze  Heart: Irregular heart rate  Abdomen: Soft, non tender, + BS  Extremities: no cyanosis or clubbing.   Mild edema    LABs:  CBC:   Recent Labs      02/16/18   0821  02/17/18   0654   WBC  10.7  12.4*   HGB  12.4*  12.3*   HCT  37.8*  39.0*   PLT  275  261     BMP:   Recent Labs      02/16/18   0821  02/17/18   0654   NA  140  142   K  4.5  4.3   CO2  29  31   BUN  15  22   CREATININE  1.02  1.00   LABGLOM  >60  >60   GLUCOSE  153*  153*     Radiology:      Impression:  · Acute on chronic respiratory failure  · Acute On chronic systolic heart failure, EF 35-40%/nonischemic Cardiomyopathy  · Bilateral pleural effusions R>L / atelectasis  · COPD, 35-pack-year smoking  · Mild secondary pulmonary hypertension  · Atrial fibrillation  · DM II, HLD, HTN, CAD    Recommendations:  · Stat ABG  · High flow nasal cannula, keep oxygen saturation > 92%  · BiPAP for sleep and as neededencourage use  · Diuresis  · Spiriva  · Albuterol Q 4 hours prn  · Dulera 200  · Amiodarone switched to PO  · LLOYD/Cardioversion on hold secondary to hypoxia/respiratory distress - Now planned as outpatient  · Labs: CBC and BMP in am  · X-ray chest in a.m.  · DVT prophylaxis, on Eliquis  · Home O2 evaluation prior to discharge  · Sleep studies as outpatient  · Incentive spirometry every hour while awake  · Will

## 2018-02-17 NOTE — PROGRESS NOTES
Progress Note    2/17/2018   9:34 AM    Name:  Jonn Brito SR  MRN:    [id number]     Acct:     [de-identified]   Room:  [address] [place] [address] Day: 4     Admit Date: 2/13/2018 11:17 AM  PCP: Jimmy Soto DO    Subjective:     C/C:   Chief Complaint   Patient presents with    Shortness of Breath       Interval History: Status: not changed and patient is awake and getting breathing treatments    ROS:  Constitutional: negative for chills, fevers, sweats  Respiratory: negative for cough, dyspnea on exertion, hemoptysis, shortness of breath, wheezing  Cardiovascular: negative for chest pain, chest pressure/discomfort, dyspnea, lower extremity edema, palpitations  Gastrointestinal: negative for abdominal pain, constipation, diarrhea, nausea, vomiting  Neurological: negative for dizziness and headaches    Medications: Allergies:    Allergies   Allergen Reactions    Influenza Vaccines Palpitations and Other (See Comments)     Hallucinations       Current Meds:   furosemide (LASIX) injection 40 mg BID   metoprolol (LOPRESSOR) tablet 100 mg BID   digoxin (LANOXIN) tablet 125 mcg Daily   amiodarone (CORDARONE) tablet 400 mg BID   Followed by    Shai Dupree ON 3/2/2018] amiodarone (CORDARONE) tablet 200 mg BID   amLODIPine (NORVASC) tablet 2.5 mg Daily   apixaban (ELIQUIS) tablet 5 mg BID   sacubitril-valsartan (ENTRESTO) 24-26 MG per tablet 1 tablet BID   metFORMIN (GLUCOPHAGE) tablet 850 mg BID WC   clonazePAM (KLONOPIN) tablet 0.5 mg Nightly PRN   tiotropium (SPIRIVA) inhalation capsule 18 mcg Daily   mometasone-formoterol (DULERA) 200-5 MCG/ACT inhaler 2 puff BID   ipratropium-albuterol (DUONEB) nebulizer solution 1 ampule As Directed RT PRN   atorvastatin (LIPITOR) tablet 10 mg Every Other Day   fluticasone (FLONASE) 50 MCG/ACT nasal spray 2 spray Daily   gabapentin (NEURONTIN) capsule 300 mg BID   hydrALAZINE (APRESOLINE) tablet 100 mg BID   nitroGLYCERIN (NITROSTAT) SL tablet 0.4 mg Q5 Min PRN   sodium chloride flush 0.9

## 2018-02-18 ENCOUNTER — APPOINTMENT (OUTPATIENT)
Dept: GENERAL RADIOLOGY | Age: 73
DRG: 291 | End: 2018-02-18
Payer: MEDICARE

## 2018-02-18 LAB
ABSOLUTE EOS #: 0 K/UL (ref 0–0.4)
ABSOLUTE IMMATURE GRANULOCYTE: ABNORMAL K/UL (ref 0–0.3)
ABSOLUTE LYMPH #: 0.7 K/UL (ref 1–4.8)
ABSOLUTE MONO #: 1.2 K/UL (ref 0.2–0.8)
ANION GAP SERPL CALCULATED.3IONS-SCNC: 8 MMOL/L (ref 9–17)
BASOPHILS # BLD: 0 % (ref 0–2)
BASOPHILS ABSOLUTE: 0 K/UL (ref 0–0.2)
BUN BLDV-MCNC: 24 MG/DL (ref 8–23)
BUN/CREAT BLD: 27 (ref 9–20)
CALCIUM SERPL-MCNC: 8.7 MG/DL (ref 8.6–10.4)
CHLORIDE BLD-SCNC: 101 MMOL/L (ref 98–107)
CO2: 34 MMOL/L (ref 20–31)
CREAT SERPL-MCNC: 0.89 MG/DL (ref 0.7–1.2)
DIFFERENTIAL TYPE: ABNORMAL
EOSINOPHILS RELATIVE PERCENT: 0 % (ref 1–4)
GFR AFRICAN AMERICAN: >60 ML/MIN
GFR NON-AFRICAN AMERICAN: >60 ML/MIN
GFR SERPL CREATININE-BSD FRML MDRD: ABNORMAL ML/MIN/{1.73_M2}
GFR SERPL CREATININE-BSD FRML MDRD: ABNORMAL ML/MIN/{1.73_M2}
GLUCOSE BLD-MCNC: 127 MG/DL (ref 75–110)
GLUCOSE BLD-MCNC: 129 MG/DL (ref 75–110)
GLUCOSE BLD-MCNC: 130 MG/DL (ref 75–110)
GLUCOSE BLD-MCNC: 141 MG/DL (ref 70–99)
GLUCOSE BLD-MCNC: 229 MG/DL (ref 75–110)
GLUCOSE BLD-MCNC: 78 MG/DL (ref 75–110)
HCT VFR BLD CALC: 38.1 % (ref 41–53)
HEMOGLOBIN: 12.1 G/DL (ref 13.5–17.5)
IMMATURE GRANULOCYTES: ABNORMAL %
LYMPHOCYTES # BLD: 5 % (ref 24–44)
MCH RBC QN AUTO: 29.4 PG (ref 26–34)
MCHC RBC AUTO-ENTMCNC: 31.7 G/DL (ref 31–37)
MCV RBC AUTO: 92.9 FL (ref 80–100)
MONOCYTES # BLD: 10 % (ref 1–7)
NRBC AUTOMATED: ABNORMAL PER 100 WBC
PDW BLD-RTO: 15.9 % (ref 11.5–14.5)
PLATELET # BLD: 253 K/UL (ref 130–400)
PLATELET ESTIMATE: ABNORMAL
PMV BLD AUTO: 7.9 FL (ref 6–12)
POTASSIUM SERPL-SCNC: 4.4 MMOL/L (ref 3.7–5.3)
RBC # BLD: 4.11 M/UL (ref 4.5–5.9)
RBC # BLD: ABNORMAL 10*6/UL
SEG NEUTROPHILS: 85 % (ref 36–66)
SEGMENTED NEUTROPHILS ABSOLUTE COUNT: 10.2 K/UL (ref 1.8–7.7)
SODIUM BLD-SCNC: 143 MMOL/L (ref 135–144)
WBC # BLD: 12.2 K/UL (ref 3.5–11)
WBC # BLD: ABNORMAL 10*3/UL

## 2018-02-18 PROCEDURE — 94660 CPAP INITIATION&MGMT: CPT

## 2018-02-18 PROCEDURE — 2060000000 HC ICU INTERMEDIATE R&B

## 2018-02-18 PROCEDURE — 94640 AIRWAY INHALATION TREATMENT: CPT

## 2018-02-18 PROCEDURE — 6370000000 HC RX 637 (ALT 250 FOR IP): Performed by: INTERNAL MEDICINE

## 2018-02-18 PROCEDURE — 6370000000 HC RX 637 (ALT 250 FOR IP): Performed by: NURSE PRACTITIONER

## 2018-02-18 PROCEDURE — 71045 X-RAY EXAM CHEST 1 VIEW: CPT

## 2018-02-18 PROCEDURE — 85025 COMPLETE CBC W/AUTO DIFF WBC: CPT

## 2018-02-18 PROCEDURE — 82947 ASSAY GLUCOSE BLOOD QUANT: CPT

## 2018-02-18 PROCEDURE — 2580000003 HC RX 258: Performed by: INTERNAL MEDICINE

## 2018-02-18 PROCEDURE — 80048 BASIC METABOLIC PNL TOTAL CA: CPT

## 2018-02-18 PROCEDURE — 6360000002 HC RX W HCPCS: Performed by: INTERNAL MEDICINE

## 2018-02-18 PROCEDURE — 36415 COLL VENOUS BLD VENIPUNCTURE: CPT

## 2018-02-18 RX ADMIN — AMIODARONE HYDROCHLORIDE 400 MG: 200 TABLET ORAL at 21:46

## 2018-02-18 RX ADMIN — AMLODIPINE BESYLATE 2.5 MG: 5 TABLET ORAL at 10:18

## 2018-02-18 RX ADMIN — AMIODARONE HYDROCHLORIDE 400 MG: 200 TABLET ORAL at 10:17

## 2018-02-18 RX ADMIN — MOMETASONE FUROATE AND FORMOTEROL FUMARATE DIHYDRATE 2 PUFF: 200; 5 AEROSOL RESPIRATORY (INHALATION) at 09:07

## 2018-02-18 RX ADMIN — TIOTROPIUM BROMIDE 18 MCG: 18 CAPSULE ORAL; RESPIRATORY (INHALATION) at 09:07

## 2018-02-18 RX ADMIN — Medication 10 ML: at 21:46

## 2018-02-18 RX ADMIN — SACUBITRIL AND VALSARTAN 1 TABLET: 24; 26 TABLET, FILM COATED ORAL at 10:16

## 2018-02-18 RX ADMIN — FUROSEMIDE 40 MG: 10 INJECTION, SOLUTION INTRAMUSCULAR; INTRAVENOUS at 10:15

## 2018-02-18 RX ADMIN — HYDRALAZINE HYDROCHLORIDE 100 MG: 25 TABLET ORAL at 21:46

## 2018-02-18 RX ADMIN — GABAPENTIN 300 MG: 300 CAPSULE ORAL at 21:46

## 2018-02-18 RX ADMIN — Medication 10 ML: at 10:17

## 2018-02-18 RX ADMIN — METFORMIN HYDROCHLORIDE 850 MG: 850 TABLET ORAL at 10:16

## 2018-02-18 RX ADMIN — DIGOXIN 125 MCG: 125 TABLET ORAL at 10:15

## 2018-02-18 RX ADMIN — METFORMIN HYDROCHLORIDE 850 MG: 850 TABLET ORAL at 17:24

## 2018-02-18 RX ADMIN — APIXABAN 5 MG: 5 TABLET, FILM COATED ORAL at 10:15

## 2018-02-18 RX ADMIN — SACUBITRIL AND VALSARTAN 1 TABLET: 24; 26 TABLET, FILM COATED ORAL at 21:46

## 2018-02-18 RX ADMIN — METOPROLOL TARTRATE 100 MG: 100 TABLET ORAL at 21:46

## 2018-02-18 RX ADMIN — HYDRALAZINE HYDROCHLORIDE 100 MG: 25 TABLET ORAL at 10:18

## 2018-02-18 RX ADMIN — INSULIN LISPRO 6 UNITS: 100 INJECTION, SOLUTION INTRAVENOUS; SUBCUTANEOUS at 13:26

## 2018-02-18 RX ADMIN — METOPROLOL TARTRATE 100 MG: 100 TABLET ORAL at 10:18

## 2018-02-18 RX ADMIN — FUROSEMIDE 40 MG: 10 INJECTION, SOLUTION INTRAMUSCULAR; INTRAVENOUS at 17:24

## 2018-02-18 RX ADMIN — MOMETASONE FUROATE AND FORMOTEROL FUMARATE DIHYDRATE 2 PUFF: 200; 5 AEROSOL RESPIRATORY (INHALATION) at 20:45

## 2018-02-18 RX ADMIN — FLUTICASONE PROPIONATE 2 SPRAY: 50 SPRAY, METERED NASAL at 10:15

## 2018-02-18 RX ADMIN — GABAPENTIN 300 MG: 300 CAPSULE ORAL at 10:16

## 2018-02-18 RX ADMIN — CLONAZEPAM 0.5 MG: 0.5 TABLET ORAL at 21:47

## 2018-02-18 RX ADMIN — APIXABAN 5 MG: 5 TABLET, FILM COATED ORAL at 21:46

## 2018-02-18 ASSESSMENT — PAIN SCALES - GENERAL: PAINLEVEL_OUTOF10: 0

## 2018-02-18 NOTE — PROGRESS NOTES
prophylaxis, on Eliquis  · Home O2 evaluation prior to discharge  · Sleep studies as outpatient  · Incentive spirometry every hour while awake  · Will follow with you    Electronically signed by     Clive Mcallister CNP on 2/18/2018 at 3:57 PM  Patient was seen under the supervision of Dr. Marsha Devlin and Sleep Medicine,    Patient seen and evaluated by me. He is much more awake and talkative than yesterday. He is on high flow oxygen by nasal cannula. He denies any chest pain. His shortness of breath is still present. Denies any significant cough. Lung exam reveals basal crackles but no wheezing. There is also peripheral edema plan is to continue Dulera/Spiriva/albuterol aerosol treatment. I reviewed his x-ray which suggests vascular congestion. We will continue diuresis. We will repeat X-ray chest and labs in the morning. Above was reviewed and discussed with Sameer Kumar CNP. And I agree with assessment and plan of care.   Electronically signed by     Flako Marquez MD on 2/18/2018 at 6:37 PM  Pulmonary Critical Care and Sleep Medicine,  [facility]  Cell: [phone]  Office: [phone]

## 2018-02-18 NOTE — PROGRESS NOTES
kg)   SpO2 93%   BMI 46.07 kg/m²   General appearance: alert and cooperative with exam  HEENT: Head: Normocephalic, no lesions, without obvious abnormality. Neck: no JVD  Lungs: decrease air entry wheezing bilaterally  Heart: irregular irregular, distant heart sounds   Abdomen: soft, non-tender  Extremities: no edema  Neurologic: not done    Patient Active Problem List:     Hypercholesterolemia     Osteoarthrosis involving lower leg     Impotence of organic origin     Coronary atherosclerosis     Other testicular hypofunction     Type 2 diabetes mellitus without complication, without long-term current use of insulin (HCC)     Essential hypertension     Chronic systolic congestive heart failure (HCC)     RAJ (obstructive sleep apnea) intolerant of cpap      Morbid obesity with BMI of 45.0-49.9, adult (HCC)     Restrictive lung disease secondary to obesity     Simple chronic bronchitis (HCC)     Stenosis, spinal, lumbar     CHF (congestive heart failure), NYHA class I, acute on chronic, combined ([facility]    TTE 02/14/18  Summary  Technically difficult study due to obesity and lung artifact. Mild left ventricular hypertrophy  Mildly enlarged left ventricle. Global left ventricular systolic function is mildly reduced  Estimated ejection fraction is 35-40 % . Left atrium is mildly dilated. Right atrial dilatation. Trivial tricuspid regurgitation. Mild pulmonary hypertension. No pericardial effusion seen. Normal aortic root dimension. Assessment / Acute Cardiac Problems:     1. Acute systolic CHF- being diuresed: improving. 2. COPD with mild pulmonary HTN  3. Moderate cardiomyopathy with LVEF 35-40% by TTE. No ischemia by nuclear stress test 02/2017 with LVEF 37%. 4. CAD with h/o TESSIE to RCA in 2011  5. New onset Atrial fibrillation: started on rate control and anticoagulation. 6. HTN  7. HL  8. H/o GI bleeding     Plan of Treatment:     1.  Continue diuresis, monitor renal function, electrolytes, Is&Os

## 2018-02-19 ENCOUNTER — APPOINTMENT (OUTPATIENT)
Dept: GENERAL RADIOLOGY | Age: 73
DRG: 291 | End: 2018-02-19
Payer: MEDICARE

## 2018-02-19 LAB
ABSOLUTE EOS #: 0.1 K/UL (ref 0–0.4)
ABSOLUTE IMMATURE GRANULOCYTE: ABNORMAL K/UL (ref 0–0.3)
ABSOLUTE LYMPH #: 0.8 K/UL (ref 1–4.8)
ABSOLUTE MONO #: 1.1 K/UL (ref 0.2–0.8)
ANION GAP SERPL CALCULATED.3IONS-SCNC: 8 MMOL/L (ref 9–17)
BASOPHILS # BLD: 0 % (ref 0–2)
BASOPHILS ABSOLUTE: 0 K/UL (ref 0–0.2)
BUN BLDV-MCNC: 22 MG/DL (ref 8–23)
BUN/CREAT BLD: 25 (ref 9–20)
CALCIUM SERPL-MCNC: 8.7 MG/DL (ref 8.6–10.4)
CHLORIDE BLD-SCNC: 103 MMOL/L (ref 98–107)
CO2: 32 MMOL/L (ref 20–31)
CREAT SERPL-MCNC: 0.87 MG/DL (ref 0.7–1.2)
DIFFERENTIAL TYPE: ABNORMAL
EOSINOPHILS RELATIVE PERCENT: 1 % (ref 1–4)
GFR AFRICAN AMERICAN: >60 ML/MIN
GFR NON-AFRICAN AMERICAN: >60 ML/MIN
GFR SERPL CREATININE-BSD FRML MDRD: ABNORMAL ML/MIN/{1.73_M2}
GFR SERPL CREATININE-BSD FRML MDRD: ABNORMAL ML/MIN/{1.73_M2}
GLUCOSE BLD-MCNC: 124 MG/DL (ref 75–110)
GLUCOSE BLD-MCNC: 131 MG/DL (ref 75–110)
GLUCOSE BLD-MCNC: 143 MG/DL (ref 70–99)
GLUCOSE BLD-MCNC: 145 MG/DL (ref 75–110)
GLUCOSE BLD-MCNC: 201 MG/DL (ref 75–110)
HCT VFR BLD CALC: 37.3 % (ref 41–53)
HEMOGLOBIN: 12.1 G/DL (ref 13.5–17.5)
IMMATURE GRANULOCYTES: ABNORMAL %
LYMPHOCYTES # BLD: 9 % (ref 24–44)
MCH RBC QN AUTO: 29.7 PG (ref 26–34)
MCHC RBC AUTO-ENTMCNC: 32.3 G/DL (ref 31–37)
MCV RBC AUTO: 91.8 FL (ref 80–100)
MONOCYTES # BLD: 11 % (ref 1–7)
MYOGLOBIN: 45 NG/ML (ref 28–72)
NRBC AUTOMATED: ABNORMAL PER 100 WBC
PDW BLD-RTO: 15.3 % (ref 11.5–14.5)
PLATELET # BLD: 241 K/UL (ref 130–400)
PLATELET ESTIMATE: ABNORMAL
PMV BLD AUTO: ABNORMAL FL (ref 6–12)
POTASSIUM SERPL-SCNC: 4.2 MMOL/L (ref 3.7–5.3)
RBC # BLD: 4.06 M/UL (ref 4.5–5.9)
RBC # BLD: ABNORMAL 10*6/UL
SEG NEUTROPHILS: 79 % (ref 36–66)
SEGMENTED NEUTROPHILS ABSOLUTE COUNT: 7.9 K/UL (ref 1.8–7.7)
SODIUM BLD-SCNC: 143 MMOL/L (ref 135–144)
TROPONIN INTERP: NORMAL
TROPONIN T: <0.03 NG/ML
WBC # BLD: 9.9 K/UL (ref 3.5–11)
WBC # BLD: ABNORMAL 10*3/UL

## 2018-02-19 PROCEDURE — 6370000000 HC RX 637 (ALT 250 FOR IP): Performed by: NURSE PRACTITIONER

## 2018-02-19 PROCEDURE — 83874 ASSAY OF MYOGLOBIN: CPT

## 2018-02-19 PROCEDURE — 6370000000 HC RX 637 (ALT 250 FOR IP): Performed by: INTERNAL MEDICINE

## 2018-02-19 PROCEDURE — 36415 COLL VENOUS BLD VENIPUNCTURE: CPT

## 2018-02-19 PROCEDURE — 84484 ASSAY OF TROPONIN QUANT: CPT

## 2018-02-19 PROCEDURE — 6360000002 HC RX W HCPCS: Performed by: INTERNAL MEDICINE

## 2018-02-19 PROCEDURE — 80048 BASIC METABOLIC PNL TOTAL CA: CPT

## 2018-02-19 PROCEDURE — 71045 X-RAY EXAM CHEST 1 VIEW: CPT

## 2018-02-19 PROCEDURE — 85025 COMPLETE CBC W/AUTO DIFF WBC: CPT

## 2018-02-19 PROCEDURE — 94660 CPAP INITIATION&MGMT: CPT

## 2018-02-19 PROCEDURE — 2700000000 HC OXYGEN THERAPY PER DAY

## 2018-02-19 PROCEDURE — 2580000003 HC RX 258: Performed by: INTERNAL MEDICINE

## 2018-02-19 PROCEDURE — 94761 N-INVAS EAR/PLS OXIMETRY MLT: CPT

## 2018-02-19 PROCEDURE — 99232 SBSQ HOSP IP/OBS MODERATE 35: CPT | Performed by: INTERNAL MEDICINE

## 2018-02-19 PROCEDURE — 82947 ASSAY GLUCOSE BLOOD QUANT: CPT

## 2018-02-19 PROCEDURE — 94640 AIRWAY INHALATION TREATMENT: CPT

## 2018-02-19 PROCEDURE — 2060000000 HC ICU INTERMEDIATE R&B

## 2018-02-19 RX ORDER — BUDESONIDE 0.5 MG/2ML
0.5 INHALANT ORAL 2 TIMES DAILY
Status: DISCONTINUED | OUTPATIENT
Start: 2018-02-19 | End: 2018-02-21 | Stop reason: HOSPADM

## 2018-02-19 RX ORDER — FORMOTEROL FUMARATE 20 UG/2ML
20 SOLUTION RESPIRATORY (INHALATION) 2 TIMES DAILY
Status: DISCONTINUED | OUTPATIENT
Start: 2018-02-19 | End: 2018-02-21 | Stop reason: HOSPADM

## 2018-02-19 RX ORDER — ALBUTEROL SULFATE 2.5 MG/3ML
2.5 SOLUTION RESPIRATORY (INHALATION) 4 TIMES DAILY
Status: DISCONTINUED | OUTPATIENT
Start: 2018-02-19 | End: 2018-02-20

## 2018-02-19 RX ADMIN — BUDESONIDE 500 MCG: 0.5 SUSPENSION RESPIRATORY (INHALATION) at 20:47

## 2018-02-19 RX ADMIN — Medication 10 ML: at 20:25

## 2018-02-19 RX ADMIN — TIOTROPIUM BROMIDE 18 MCG: 18 CAPSULE ORAL; RESPIRATORY (INHALATION) at 09:29

## 2018-02-19 RX ADMIN — AMIODARONE HYDROCHLORIDE 400 MG: 200 TABLET ORAL at 20:24

## 2018-02-19 RX ADMIN — CLONAZEPAM 0.5 MG: 0.5 TABLET ORAL at 21:15

## 2018-02-19 RX ADMIN — AMLODIPINE BESYLATE 2.5 MG: 5 TABLET ORAL at 08:42

## 2018-02-19 RX ADMIN — ATORVASTATIN CALCIUM 10 MG: 10 TABLET, FILM COATED ORAL at 08:40

## 2018-02-19 RX ADMIN — AMIODARONE HYDROCHLORIDE 400 MG: 200 TABLET ORAL at 08:41

## 2018-02-19 RX ADMIN — ACETAMINOPHEN 650 MG: 325 TABLET ORAL at 19:01

## 2018-02-19 RX ADMIN — SACUBITRIL AND VALSARTAN 1 TABLET: 24; 26 TABLET, FILM COATED ORAL at 20:25

## 2018-02-19 RX ADMIN — HYDRALAZINE HYDROCHLORIDE 100 MG: 25 TABLET ORAL at 20:24

## 2018-02-19 RX ADMIN — SACUBITRIL AND VALSARTAN 1 TABLET: 24; 26 TABLET, FILM COATED ORAL at 08:41

## 2018-02-19 RX ADMIN — ALBUTEROL SULFATE 2.5 MG: 2.5 SOLUTION RESPIRATORY (INHALATION) at 20:51

## 2018-02-19 RX ADMIN — METOPROLOL TARTRATE 100 MG: 100 TABLET ORAL at 08:43

## 2018-02-19 RX ADMIN — GABAPENTIN 300 MG: 300 CAPSULE ORAL at 20:24

## 2018-02-19 RX ADMIN — GABAPENTIN 300 MG: 300 CAPSULE ORAL at 08:42

## 2018-02-19 RX ADMIN — METOPROLOL TARTRATE 100 MG: 100 TABLET ORAL at 20:24

## 2018-02-19 RX ADMIN — HYDRALAZINE HYDROCHLORIDE 100 MG: 25 TABLET ORAL at 08:41

## 2018-02-19 RX ADMIN — FLUTICASONE PROPIONATE 2 SPRAY: 50 SPRAY, METERED NASAL at 08:43

## 2018-02-19 RX ADMIN — APIXABAN 5 MG: 5 TABLET, FILM COATED ORAL at 08:42

## 2018-02-19 RX ADMIN — MOMETASONE FUROATE AND FORMOTEROL FUMARATE DIHYDRATE 2 PUFF: 200; 5 AEROSOL RESPIRATORY (INHALATION) at 09:29

## 2018-02-19 RX ADMIN — DIGOXIN 125 MCG: 125 TABLET ORAL at 08:42

## 2018-02-19 RX ADMIN — FORMOTEROL FUMARATE DIHYDRATE 20 MCG: 20 SOLUTION RESPIRATORY (INHALATION) at 20:47

## 2018-02-19 RX ADMIN — METFORMIN HYDROCHLORIDE 850 MG: 850 TABLET ORAL at 19:01

## 2018-02-19 RX ADMIN — FUROSEMIDE 40 MG: 10 INJECTION, SOLUTION INTRAMUSCULAR; INTRAVENOUS at 19:05

## 2018-02-19 RX ADMIN — INSULIN LISPRO 3 UNITS: 100 INJECTION, SOLUTION INTRAVENOUS; SUBCUTANEOUS at 21:39

## 2018-02-19 RX ADMIN — FUROSEMIDE 40 MG: 10 INJECTION, SOLUTION INTRAMUSCULAR; INTRAVENOUS at 08:43

## 2018-02-19 RX ADMIN — APIXABAN 5 MG: 5 TABLET, FILM COATED ORAL at 20:25

## 2018-02-19 RX ADMIN — INSULIN LISPRO 3 UNITS: 100 INJECTION, SOLUTION INTRAVENOUS; SUBCUTANEOUS at 13:28

## 2018-02-19 RX ADMIN — METFORMIN HYDROCHLORIDE 850 MG: 850 TABLET ORAL at 08:40

## 2018-02-19 RX ADMIN — Medication 10 ML: at 08:44

## 2018-02-19 ASSESSMENT — PAIN SCALES - GENERAL
PAINLEVEL_OUTOF10: 4
PAINLEVEL_OUTOF10: 7
PAINLEVEL_OUTOF10: 0
PAINLEVEL_OUTOF10: 0

## 2018-02-19 NOTE — PROGRESS NOTES
Progress Note    2/19/2018   1:20 PM    Name:  Morris Carrasquillo SR  MRN:    [id number]     Acct:     [de-identified]   Room:  [address] [place] [address] Day: 6     Admit Date: 2/13/2018 11:17 AM  PCP: Lilia Huff DO    Subjective:     C/C:   Chief Complaint   Patient presents with    Shortness of Breath       Interval History: Status: improved and still on high flow oxygen resting comfortably    ROS:  Constitutional: negative for chills, fevers, sweats  Respiratory: Positive for cough, dyspnea on exertion, hemoptysis, shortness of breath, wheezing  Cardiovascular: negative for chest pain, chest pressure/discomfort, dyspnea, lower extremity edema, palpitations  Gastrointestinal: negative for abdominal pain, constipation, diarrhea, nausea, vomiting  Neurological: negative for dizziness and headaches    Medications: Allergies:    Allergies   Allergen Reactions    Influenza Vaccines Palpitations and Other (See Comments)     Hallucinations       Current Meds:   furosemide (LASIX) injection 40 mg BID   metoprolol (LOPRESSOR) tablet 100 mg BID   digoxin (LANOXIN) tablet 125 mcg Daily   amiodarone (CORDARONE) tablet 400 mg BID   Followed by    Deysi Engle ON 3/2/2018] amiodarone (CORDARONE) tablet 200 mg BID   amLODIPine (NORVASC) tablet 2.5 mg Daily   apixaban (ELIQUIS) tablet 5 mg BID   sacubitril-valsartan (ENTRESTO) 24-26 MG per tablet 1 tablet BID   metFORMIN (GLUCOPHAGE) tablet 850 mg BID WC   clonazePAM (KLONOPIN) tablet 0.5 mg Nightly PRN   tiotropium (SPIRIVA) inhalation capsule 18 mcg Daily   mometasone-formoterol (DULERA) 200-5 MCG/ACT inhaler 2 puff BID   ipratropium-albuterol (DUONEB) nebulizer solution 1 ampule As Directed RT PRN   atorvastatin (LIPITOR) tablet 10 mg Every Other Day   fluticasone (FLONASE) 50 MCG/ACT nasal spray 2 spray Daily   gabapentin (NEURONTIN) capsule 300 mg BID   hydrALAZINE (APRESOLINE) tablet 100 mg BID   nitroGLYCERIN (NITROSTAT) SL tablet 0.4 mg Q5 Min PRN   sodium chloride flush 0.9 % injection 10 mL 2 times per day   sodium chloride flush 0.9 % injection 10 mL PRN   acetaminophen (TYLENOL) tablet 650 mg Q4H PRN   HYDROcodone-acetaminophen (NORCO) 5-325 MG per tablet 1 tablet Q4H PRN   magnesium hydroxide (MILK OF MAGNESIA) 400 MG/5ML suspension 30 mL Daily PRN   ondansetron (ZOFRAN) injection 4 mg Q6H PRN   insulin lispro (HUMALOG) injection vial 0-18 Units TID WC   insulin lispro (HUMALOG) injection vial 0-9 Units Nightly   glucose (GLUTOSE) 40 % oral gel 15 g PRN   dextrose 50 % solution 12.5 g PRN   glucagon (rDNA) injection 1 mg PRN   dextrose 5 % solution PRN       Data:     Code Status:  Full Code    Family History   Problem Relation Age of Onset    Cancer Mother     Other Father      NonHodgkins Lymphoma       Social History     Social History    Marital status: Single     Spouse name: N/A    Number of children: N/A    Years of education: N/A     Occupational History    Not on file. Social History Main Topics    Smoking status: Former Smoker     Quit date: 1985    Smokeless tobacco: Never Used    Alcohol use No    Drug use: No    Sexual activity: Not on file     Other Topics Concern    Not on file     Social History Narrative    No narrative on file       Vitals:  BP (!) 108/54   Pulse 69   Temp 97.7 °F (36.5 °C) (Oral)   Resp 20   Ht 5' 9\" (1.753 m)   Wt (!) 312 lb (141.5 kg)   SpO2 95%   BMI 46.07 kg/m²   Temp (24hrs), Av.9 °F (36.6 °C), Min:97.3 °F (36.3 °C), Max:98.4 °F (36.9 °C)    Invalid input(s): [facility]    I/O (24Hr):     Intake/Output Summary (Last 24 hours) at 18 1320  Last data filed at 18 1211   Gross per 24 hour   Intake             1030 ml   Output             1775 ml   Net             -745 ml       Labs:  [unfilled]    Physical Examination:        General appearance: alert, cooperative and no distress  Mental Status: oriented to person, place and time and normal affect  Lungs: Few crackles to auscultation bilaterally and

## 2018-02-19 NOTE — PROGRESS NOTES
Pulmonary Critical Care Progress Note       Patient seen for the follow up of CHF exacerbation/COPD    Subjective: He went to the bathroom without his oxygen had an accident and he soiled himself from fecal incontinence. .  He has been weaned down to 6l liter O2 NC and he thinks he is going home. He denies no significant cough. He denies chest pain. He tolerated BiPAP . Examination:    Vitals: BP (!) 98/57   Pulse 94   Temp 98.6 °F (37 °C) (Oral)   Resp 22   Ht 5' 9\" (1.753 m)   Wt (!) 312 lb (141.5 kg)   SpO2 93%   BMI 46.07 kg/m²     General appearance:  awake and alert, cooperative  Neck: No JVD  Lungs: Decreased  Breath sounds  no wheezing    Heart: Irregular heart rate nl S1 S2  Abdomen: Soft, non tender, + BS  Extremities: no cyanosis or clubbing. Mild edema    LABs:  CBC:   Recent Labs      02/18/18   0637  02/19/18   0650   WBC  12.2*  9.9   HGB  12.1*  12.1*   HCT  38.1*  37.3*   PLT  253  241     BMP:   Recent Labs      02/18/18   0637  02/19/18   0650   NA  143  143   K  4.4  4.2   CO2  34*  32*   BUN  24*  22   CREATININE  0.89  0.87   LABGLOM  >60  >60   GLUCOSE  141*  143*     Radiology:    2/19/18  Stable cardiomediastinal silhouette and central pulmonary venous congestion. Bibasilar airspace opacities are unchanged as well as trace bilateral pleural   effusions.  No pneumothorax.              Impression:  · Acute on chronic respiratory failure  · Pulmonary edema /Acute On chronic systolic heart failure, EF 35-40%/nonischemic Cardiomyopathy  · Bilateral pleural effusions  · COPD, 35-pack-year smoking  · Probable RAJ  · Mild secondary pulmonary hypertension  · Atrial fibrillation  · DM II, HLD, HTN, CAD    Recommendations:    · O2 nasal cannula, keep oxygen saturation > 92%  · Incentive spirometer every hour while awake  · Home O2 evaluation prior to discharge  · BiPAP for sleep and as neededencourage use  · Stop Dulera 200  · Pulmicort and performist nebs Q

## 2018-02-20 ENCOUNTER — APPOINTMENT (OUTPATIENT)
Dept: GENERAL RADIOLOGY | Age: 73
DRG: 291 | End: 2018-02-20
Payer: MEDICARE

## 2018-02-20 LAB
ABSOLUTE EOS #: 0.1 K/UL (ref 0–0.4)
ABSOLUTE IMMATURE GRANULOCYTE: ABNORMAL K/UL (ref 0–0.3)
ABSOLUTE LYMPH #: 0.8 K/UL (ref 1–4.8)
ABSOLUTE MONO #: 1.1 K/UL (ref 0.2–0.8)
ANION GAP SERPL CALCULATED.3IONS-SCNC: 9 MMOL/L (ref 9–17)
BASOPHILS # BLD: 1 % (ref 0–2)
BASOPHILS ABSOLUTE: 0.1 K/UL (ref 0–0.2)
BUN BLDV-MCNC: 19 MG/DL (ref 8–23)
BUN/CREAT BLD: 22 (ref 9–20)
CALCIUM SERPL-MCNC: 8.9 MG/DL (ref 8.6–10.4)
CHLORIDE BLD-SCNC: 102 MMOL/L (ref 98–107)
CO2: 34 MMOL/L (ref 20–31)
CREAT SERPL-MCNC: 0.85 MG/DL (ref 0.7–1.2)
DIFFERENTIAL TYPE: ABNORMAL
EOSINOPHILS RELATIVE PERCENT: 1 % (ref 1–4)
GFR AFRICAN AMERICAN: >60 ML/MIN
GFR NON-AFRICAN AMERICAN: >60 ML/MIN
GFR SERPL CREATININE-BSD FRML MDRD: ABNORMAL ML/MIN/{1.73_M2}
GFR SERPL CREATININE-BSD FRML MDRD: ABNORMAL ML/MIN/{1.73_M2}
GLUCOSE BLD-MCNC: 111 MG/DL (ref 75–110)
GLUCOSE BLD-MCNC: 113 MG/DL (ref 75–110)
GLUCOSE BLD-MCNC: 147 MG/DL (ref 70–99)
GLUCOSE BLD-MCNC: 158 MG/DL (ref 75–110)
GLUCOSE BLD-MCNC: 165 MG/DL (ref 75–110)
HCT VFR BLD CALC: 39.8 % (ref 41–53)
HEMOGLOBIN: 12.6 G/DL (ref 13.5–17.5)
IMMATURE GRANULOCYTES: ABNORMAL %
LYMPHOCYTES # BLD: 7 % (ref 24–44)
MCH RBC QN AUTO: 29.4 PG (ref 26–34)
MCHC RBC AUTO-ENTMCNC: 31.6 G/DL (ref 31–37)
MCV RBC AUTO: 93.1 FL (ref 80–100)
MONOCYTES # BLD: 10 % (ref 1–7)
NRBC AUTOMATED: ABNORMAL PER 100 WBC
PDW BLD-RTO: 15.9 % (ref 11.5–14.5)
PLATELET # BLD: 267 K/UL (ref 130–400)
PLATELET ESTIMATE: ABNORMAL
PMV BLD AUTO: 8 FL (ref 6–12)
POTASSIUM SERPL-SCNC: 4.3 MMOL/L (ref 3.7–5.3)
RBC # BLD: 4.28 M/UL (ref 4.5–5.9)
RBC # BLD: ABNORMAL 10*6/UL
SEG NEUTROPHILS: 81 % (ref 36–66)
SEGMENTED NEUTROPHILS ABSOLUTE COUNT: 9.4 K/UL (ref 1.8–7.7)
SODIUM BLD-SCNC: 145 MMOL/L (ref 135–144)
WBC # BLD: 11.5 K/UL (ref 3.5–11)
WBC # BLD: ABNORMAL 10*3/UL

## 2018-02-20 PROCEDURE — 2060000000 HC ICU INTERMEDIATE R&B

## 2018-02-20 PROCEDURE — 85025 COMPLETE CBC W/AUTO DIFF WBC: CPT

## 2018-02-20 PROCEDURE — 94640 AIRWAY INHALATION TREATMENT: CPT

## 2018-02-20 PROCEDURE — 36415 COLL VENOUS BLD VENIPUNCTURE: CPT

## 2018-02-20 PROCEDURE — 6370000000 HC RX 637 (ALT 250 FOR IP): Performed by: NURSE PRACTITIONER

## 2018-02-20 PROCEDURE — 2700000000 HC OXYGEN THERAPY PER DAY

## 2018-02-20 PROCEDURE — 6360000002 HC RX W HCPCS: Performed by: INTERNAL MEDICINE

## 2018-02-20 PROCEDURE — 6370000000 HC RX 637 (ALT 250 FOR IP): Performed by: INTERNAL MEDICINE

## 2018-02-20 PROCEDURE — 94660 CPAP INITIATION&MGMT: CPT

## 2018-02-20 PROCEDURE — 94762 N-INVAS EAR/PLS OXIMTRY CONT: CPT

## 2018-02-20 PROCEDURE — 2580000003 HC RX 258: Performed by: INTERNAL MEDICINE

## 2018-02-20 PROCEDURE — 82947 ASSAY GLUCOSE BLOOD QUANT: CPT

## 2018-02-20 PROCEDURE — 71045 X-RAY EXAM CHEST 1 VIEW: CPT

## 2018-02-20 PROCEDURE — 80048 BASIC METABOLIC PNL TOTAL CA: CPT

## 2018-02-20 RX ORDER — IPRATROPIUM BROMIDE AND ALBUTEROL SULFATE 2.5; .5 MG/3ML; MG/3ML
1 SOLUTION RESPIRATORY (INHALATION) 4 TIMES DAILY
Status: DISCONTINUED | OUTPATIENT
Start: 2018-02-20 | End: 2018-02-21 | Stop reason: HOSPADM

## 2018-02-20 RX ADMIN — ALBUTEROL SULFATE 2.5 MG: 2.5 SOLUTION RESPIRATORY (INHALATION) at 11:22

## 2018-02-20 RX ADMIN — HYDRALAZINE HYDROCHLORIDE 100 MG: 25 TABLET ORAL at 10:16

## 2018-02-20 RX ADMIN — FORMOTEROL FUMARATE DIHYDRATE 20 MCG: 20 SOLUTION RESPIRATORY (INHALATION) at 07:22

## 2018-02-20 RX ADMIN — TIOTROPIUM BROMIDE 18 MCG: 18 CAPSULE ORAL; RESPIRATORY (INHALATION) at 07:22

## 2018-02-20 RX ADMIN — Medication 10 ML: at 10:19

## 2018-02-20 RX ADMIN — AMIODARONE HYDROCHLORIDE 400 MG: 200 TABLET ORAL at 10:18

## 2018-02-20 RX ADMIN — Medication 10 ML: at 20:45

## 2018-02-20 RX ADMIN — ALBUTEROL SULFATE 2.5 MG: 2.5 SOLUTION RESPIRATORY (INHALATION) at 18:00

## 2018-02-20 RX ADMIN — METFORMIN HYDROCHLORIDE 850 MG: 850 TABLET ORAL at 10:16

## 2018-02-20 RX ADMIN — METOPROLOL TARTRATE 100 MG: 100 TABLET ORAL at 10:17

## 2018-02-20 RX ADMIN — INSULIN LISPRO 3 UNITS: 100 INJECTION, SOLUTION INTRAVENOUS; SUBCUTANEOUS at 12:24

## 2018-02-20 RX ADMIN — FORMOTEROL FUMARATE DIHYDRATE 20 MCG: 20 SOLUTION RESPIRATORY (INHALATION) at 18:08

## 2018-02-20 RX ADMIN — CLONAZEPAM 0.5 MG: 0.5 TABLET ORAL at 21:58

## 2018-02-20 RX ADMIN — HYDRALAZINE HYDROCHLORIDE 100 MG: 25 TABLET ORAL at 20:45

## 2018-02-20 RX ADMIN — ALBUTEROL SULFATE 2.5 MG: 2.5 SOLUTION RESPIRATORY (INHALATION) at 15:38

## 2018-02-20 RX ADMIN — APIXABAN 5 MG: 5 TABLET, FILM COATED ORAL at 10:18

## 2018-02-20 RX ADMIN — METFORMIN HYDROCHLORIDE 850 MG: 850 TABLET ORAL at 18:39

## 2018-02-20 RX ADMIN — BUDESONIDE 500 MCG: 0.5 SUSPENSION RESPIRATORY (INHALATION) at 07:22

## 2018-02-20 RX ADMIN — INSULIN LISPRO 2 UNITS: 100 INJECTION, SOLUTION INTRAVENOUS; SUBCUTANEOUS at 20:47

## 2018-02-20 RX ADMIN — INSULIN LISPRO 3 UNITS: 100 INJECTION, SOLUTION INTRAVENOUS; SUBCUTANEOUS at 18:39

## 2018-02-20 RX ADMIN — AMLODIPINE BESYLATE 2.5 MG: 5 TABLET ORAL at 10:17

## 2018-02-20 RX ADMIN — BUDESONIDE 500 MCG: 0.5 SUSPENSION RESPIRATORY (INHALATION) at 18:00

## 2018-02-20 RX ADMIN — FUROSEMIDE 40 MG: 10 INJECTION, SOLUTION INTRAMUSCULAR; INTRAVENOUS at 10:18

## 2018-02-20 RX ADMIN — ALBUTEROL SULFATE 2.5 MG: 2.5 SOLUTION RESPIRATORY (INHALATION) at 07:22

## 2018-02-20 RX ADMIN — SACUBITRIL AND VALSARTAN 1 TABLET: 24; 26 TABLET, FILM COATED ORAL at 20:44

## 2018-02-20 RX ADMIN — APIXABAN 5 MG: 5 TABLET, FILM COATED ORAL at 20:44

## 2018-02-20 RX ADMIN — AMIODARONE HYDROCHLORIDE 400 MG: 200 TABLET ORAL at 20:45

## 2018-02-20 RX ADMIN — GABAPENTIN 300 MG: 300 CAPSULE ORAL at 10:16

## 2018-02-20 RX ADMIN — FLUTICASONE PROPIONATE 2 SPRAY: 50 SPRAY, METERED NASAL at 10:18

## 2018-02-20 RX ADMIN — METOPROLOL TARTRATE 100 MG: 100 TABLET ORAL at 20:44

## 2018-02-20 RX ADMIN — DIGOXIN 125 MCG: 125 TABLET ORAL at 10:17

## 2018-02-20 RX ADMIN — GABAPENTIN 300 MG: 300 CAPSULE ORAL at 20:45

## 2018-02-20 RX ADMIN — ACETAMINOPHEN 650 MG: 325 TABLET ORAL at 18:54

## 2018-02-20 RX ADMIN — FUROSEMIDE 40 MG: 10 INJECTION, SOLUTION INTRAMUSCULAR; INTRAVENOUS at 18:46

## 2018-02-20 RX ADMIN — SACUBITRIL AND VALSARTAN 1 TABLET: 24; 26 TABLET, FILM COATED ORAL at 10:16

## 2018-02-20 ASSESSMENT — PAIN SCALES - GENERAL
PAINLEVEL_OUTOF10: 6
PAINLEVEL_OUTOF10: 8
PAINLEVEL_OUTOF10: 0

## 2018-02-20 NOTE — PROGRESS NOTES
Pulmonary Critical Care Progress Note       Patient seen for the follow up of CHF exacerbation/COPD    Subjective:      He has been Back on high flow O2 NC . He has no increase in shortness of breath. He has used BiPAP. He denies no significant cough. He denies chest pain. Examination:    Vitals: /64   Pulse 80   Temp 98.6 °F (37 °C) (Oral)   Resp 16   Ht 5' 9\" (1.753 m)   Wt (!) 312 lb (141.5 kg)   SpO2 93%   BMI 46.07 kg/m²     General appearance:  awake and alert, cooperative  Neck: No JVD  Lungs: Decreased  Breath sounds  no wheezing    Heart: Irregular heart rate nl S1 S2  Abdomen: Soft, non tender, + BS  Extremities: no cyanosis or clubbing. Mild edema    LABs:  CBC:   Recent Labs      02/19/18   0650  02/20/18   0610   WBC  9.9  11.5*   HGB  12.1*  12.6*   HCT  37.3*  39.8*   PLT  241  267     BMP:   Recent Labs      02/19/18   0650  02/20/18   0610   NA  143  145*   K  4.2  4.3   CO2  32*  34*   BUN  22  19   CREATININE  0.87  0.85   LABGLOM  >60  >60   GLUCOSE  143*  147*     Radiology:    2/19/18    Bilateral lower lobe airspace opacities, stable to slightly improved from   previous study.              Impression:  · Acute on chronic respiratory failure  · Pulmonary edema /Acute On chronic systolic heart failure, EF 35-40%/nonischemic Cardiomyopathy  · Bilateral pleural effusions  · COPD possible  Exacerbation, 35-pack-year smoking  · Probable RAJ  · Mild secondary pulmonary hypertension  · Atrial fibrillation  · DM II, HLD, HTN, CAD    Recommendations:    · O2 nasal cannula, keep oxygen saturation > 92%  · Incentive spirometer every hour while awake  · Home O2 evaluation prior to discharge  · BiPAP for sleep and as neededencourage use  · Pulmicort and performist nebs Q 12hrs  · Spiriva  · Stop Albuterol Q QID and PRN  · Stop Spiriva  · DuoNeb by nebulizer or times a day  · Prednisone 20 mg by mouth daily  · Lasix 40 IV Q 12hrs  · LLOYD/Cardioversion on hold secondary to

## 2018-02-20 NOTE — PROGRESS NOTES
RN enters room to find pt up to BR and incontinent of loose liquid stool . He had taken himself off of his high flow oxygen, telemetry and cont pulse ox. PCT arrives in room with clean linen and had known that pt was up to BR. RN calls out for portable oxygen and respiratory was called to update. RT states that the pt can be on 6 Liters N/C to keep SAO2 up while pt is being cleaned in the BR. Pt has large amt of stool and is promptly being cleaned up so he can return to bed on oxygen. RT is at bedside and decides to leave pt on 6 liters per N/C.  Will continue to monitor

## 2018-02-21 VITALS
HEIGHT: 69 IN | WEIGHT: 312 LBS | TEMPERATURE: 97.5 F | BODY MASS INDEX: 46.21 KG/M2 | SYSTOLIC BLOOD PRESSURE: 127 MMHG | DIASTOLIC BLOOD PRESSURE: 58 MMHG | OXYGEN SATURATION: 94 % | HEART RATE: 69 BPM | RESPIRATION RATE: 18 BRPM

## 2018-02-21 LAB
ABSOLUTE EOS #: 0.1 K/UL (ref 0–0.4)
ABSOLUTE IMMATURE GRANULOCYTE: ABNORMAL K/UL (ref 0–0.3)
ABSOLUTE LYMPH #: 0.8 K/UL (ref 1–4.8)
ABSOLUTE MONO #: 0.8 K/UL (ref 0.2–0.8)
ANION GAP SERPL CALCULATED.3IONS-SCNC: 9 MMOL/L (ref 9–17)
BASOPHILS # BLD: 0 % (ref 0–2)
BASOPHILS ABSOLUTE: 0 K/UL (ref 0–0.2)
BUN BLDV-MCNC: 16 MG/DL (ref 8–23)
BUN/CREAT BLD: 20 (ref 9–20)
CALCIUM SERPL-MCNC: 8.8 MG/DL (ref 8.6–10.4)
CHLORIDE BLD-SCNC: 102 MMOL/L (ref 98–107)
CO2: 36 MMOL/L (ref 20–31)
CREAT SERPL-MCNC: 0.81 MG/DL (ref 0.7–1.2)
DIFFERENTIAL TYPE: ABNORMAL
EOSINOPHILS RELATIVE PERCENT: 1 % (ref 1–4)
GFR AFRICAN AMERICAN: >60 ML/MIN
GFR NON-AFRICAN AMERICAN: >60 ML/MIN
GFR SERPL CREATININE-BSD FRML MDRD: ABNORMAL ML/MIN/{1.73_M2}
GFR SERPL CREATININE-BSD FRML MDRD: ABNORMAL ML/MIN/{1.73_M2}
GLUCOSE BLD-MCNC: 105 MG/DL (ref 75–110)
GLUCOSE BLD-MCNC: 108 MG/DL (ref 75–110)
GLUCOSE BLD-MCNC: 123 MG/DL (ref 70–99)
GLUCOSE BLD-MCNC: 140 MG/DL (ref 75–110)
GLUCOSE BLD-MCNC: 161 MG/DL (ref 75–110)
HCT VFR BLD CALC: 38.4 % (ref 41–53)
HEMOGLOBIN: 12 G/DL (ref 13.5–17.5)
IMMATURE GRANULOCYTES: ABNORMAL %
LYMPHOCYTES # BLD: 7 % (ref 24–44)
MCH RBC QN AUTO: 29.1 PG (ref 26–34)
MCHC RBC AUTO-ENTMCNC: 31.4 G/DL (ref 31–37)
MCV RBC AUTO: 93 FL (ref 80–100)
MONOCYTES # BLD: 8 % (ref 1–7)
NRBC AUTOMATED: ABNORMAL PER 100 WBC
PDW BLD-RTO: 15.9 % (ref 11.5–14.5)
PLATELET # BLD: 244 K/UL (ref 130–400)
PLATELET ESTIMATE: ABNORMAL
PMV BLD AUTO: 8.2 FL (ref 6–12)
POTASSIUM SERPL-SCNC: 4 MMOL/L (ref 3.7–5.3)
RBC # BLD: 4.13 M/UL (ref 4.5–5.9)
RBC # BLD: ABNORMAL 10*6/UL
SEG NEUTROPHILS: 84 % (ref 36–66)
SEGMENTED NEUTROPHILS ABSOLUTE COUNT: 9 K/UL (ref 1.8–7.7)
SODIUM BLD-SCNC: 147 MMOL/L (ref 135–144)
WBC # BLD: 10.7 K/UL (ref 3.5–11)
WBC # BLD: ABNORMAL 10*3/UL

## 2018-02-21 PROCEDURE — 6370000000 HC RX 637 (ALT 250 FOR IP): Performed by: INTERNAL MEDICINE

## 2018-02-21 PROCEDURE — 2580000003 HC RX 258: Performed by: INTERNAL MEDICINE

## 2018-02-21 PROCEDURE — 80048 BASIC METABOLIC PNL TOTAL CA: CPT

## 2018-02-21 PROCEDURE — 99232 SBSQ HOSP IP/OBS MODERATE 35: CPT | Performed by: INTERNAL MEDICINE

## 2018-02-21 PROCEDURE — 94640 AIRWAY INHALATION TREATMENT: CPT

## 2018-02-21 PROCEDURE — 36415 COLL VENOUS BLD VENIPUNCTURE: CPT

## 2018-02-21 PROCEDURE — 82947 ASSAY GLUCOSE BLOOD QUANT: CPT

## 2018-02-21 PROCEDURE — 94660 CPAP INITIATION&MGMT: CPT

## 2018-02-21 PROCEDURE — 85025 COMPLETE CBC W/AUTO DIFF WBC: CPT

## 2018-02-21 PROCEDURE — 94761 N-INVAS EAR/PLS OXIMETRY MLT: CPT

## 2018-02-21 PROCEDURE — 6360000002 HC RX W HCPCS: Performed by: INTERNAL MEDICINE

## 2018-02-21 RX ORDER — AMIODARONE HYDROCHLORIDE 200 MG/1
200 TABLET ORAL 2 TIMES DAILY
Qty: 30 TABLET | Refills: 3 | Status: CANCELLED
Start: 2018-03-02 | End: 2018-03-07

## 2018-02-21 RX ORDER — FUROSEMIDE 40 MG/1
40 TABLET ORAL ONCE
Status: COMPLETED | OUTPATIENT
Start: 2018-02-21 | End: 2018-02-21

## 2018-02-21 RX ORDER — AMIODARONE HYDROCHLORIDE 200 MG/1
200 TABLET ORAL 2 TIMES DAILY
Qty: 30 TABLET | Refills: 0 | Status: SHIPPED | OUTPATIENT
Start: 2018-03-02 | End: 2018-03-07

## 2018-02-21 RX ORDER — METOPROLOL TARTRATE 100 MG/1
100 TABLET ORAL 2 TIMES DAILY
Qty: 60 TABLET | Refills: 3 | Status: SHIPPED | OUTPATIENT
Start: 2018-02-21

## 2018-02-21 RX ORDER — AMIODARONE HYDROCHLORIDE 200 MG/1
200 TABLET ORAL 2 TIMES DAILY
Qty: 30 TABLET | Refills: 3 | Status: SHIPPED | OUTPATIENT
Start: 2018-03-02 | End: 2018-02-21

## 2018-02-21 RX ADMIN — DIGOXIN 125 MCG: 125 TABLET ORAL at 09:33

## 2018-02-21 RX ADMIN — SACUBITRIL AND VALSARTAN 1 TABLET: 24; 26 TABLET, FILM COATED ORAL at 09:33

## 2018-02-21 RX ADMIN — IPRATROPIUM BROMIDE AND ALBUTEROL SULFATE 1 AMPULE: .5; 3 SOLUTION RESPIRATORY (INHALATION) at 07:43

## 2018-02-21 RX ADMIN — Medication 10 ML: at 09:34

## 2018-02-21 RX ADMIN — METOPROLOL TARTRATE 100 MG: 100 TABLET ORAL at 09:33

## 2018-02-21 RX ADMIN — METFORMIN HYDROCHLORIDE 850 MG: 850 TABLET ORAL at 09:33

## 2018-02-21 RX ADMIN — IPRATROPIUM BROMIDE AND ALBUTEROL SULFATE 1 AMPULE: .5; 3 SOLUTION RESPIRATORY (INHALATION) at 16:15

## 2018-02-21 RX ADMIN — INSULIN LISPRO 3 UNITS: 100 INJECTION, SOLUTION INTRAVENOUS; SUBCUTANEOUS at 03:00

## 2018-02-21 RX ADMIN — GABAPENTIN 300 MG: 300 CAPSULE ORAL at 09:32

## 2018-02-21 RX ADMIN — IPRATROPIUM BROMIDE AND ALBUTEROL SULFATE 1 AMPULE: .5; 3 SOLUTION RESPIRATORY (INHALATION) at 11:38

## 2018-02-21 RX ADMIN — FUROSEMIDE 40 MG: 10 INJECTION, SOLUTION INTRAMUSCULAR; INTRAVENOUS at 09:34

## 2018-02-21 RX ADMIN — AMIODARONE HYDROCHLORIDE 400 MG: 200 TABLET ORAL at 09:33

## 2018-02-21 RX ADMIN — APIXABAN 5 MG: 5 TABLET, FILM COATED ORAL at 09:32

## 2018-02-21 RX ADMIN — FUROSEMIDE 40 MG: 40 TABLET ORAL at 19:21

## 2018-02-21 RX ADMIN — FLUTICASONE PROPIONATE 2 SPRAY: 50 SPRAY, METERED NASAL at 09:32

## 2018-02-21 RX ADMIN — AMLODIPINE BESYLATE 2.5 MG: 5 TABLET ORAL at 09:33

## 2018-02-21 RX ADMIN — FORMOTEROL FUMARATE DIHYDRATE 20 MCG: 20 SOLUTION RESPIRATORY (INHALATION) at 07:43

## 2018-02-21 RX ADMIN — HYDRALAZINE HYDROCHLORIDE 100 MG: 25 TABLET ORAL at 09:34

## 2018-02-21 RX ADMIN — BUDESONIDE 500 MCG: 0.5 SUSPENSION RESPIRATORY (INHALATION) at 07:43

## 2018-02-21 RX ADMIN — ATORVASTATIN CALCIUM 10 MG: 10 TABLET, FILM COATED ORAL at 09:33

## 2018-02-21 RX ADMIN — METFORMIN HYDROCHLORIDE 850 MG: 850 TABLET ORAL at 18:06

## 2018-02-21 ASSESSMENT — PAIN SCALES - GENERAL
PAINLEVEL_OUTOF10: 5
PAINLEVEL_OUTOF10: 0

## 2018-02-21 ASSESSMENT — PAIN DESCRIPTION - PAIN TYPE: TYPE: ACUTE PAIN

## 2018-02-21 NOTE — PROGRESS NOTES
Progress Note    2/21/2018   8:32 AM    Name:  Deja Haddad SR  MRN:    [id number]     Acct:     [de-identified]   Room:  [address] [place] [address] Day: 8     Admit Date: 2/13/2018 11:17 AM  PCP: Marie Thomas DO    Subjective:     C/C:   Chief Complaint   Patient presents with    Shortness of Breath       Interval History: Status: improved and patient getting everything treatments and still on high flow oxygen denies any new complaints    ROS:  Constitutional: negative for chills, fevers, sweats  Respiratory:  positive for cough congestion, wheezing  Cardiovascular: negative for chest pain, chest pressure/discomfort, dyspnea, lower extremity edema, palpitations  Gastrointestinal: negative for abdominal pain, constipation, diarrhea, nausea, vomiting  Neurological: negative for dizziness and headaches    Medications: Allergies:    Allergies   Allergen Reactions    Influenza Vaccines Palpitations and Other (See Comments)     Hallucinations       Current Meds:   ipratropium-albuterol (DUONEB) nebulizer solution 1 ampule 4x daily   budesonide (PULMICORT) nebulizer suspension 500 mcg BID   formoterol (PERFOROMIST) nebulizer solution 20 mcg BID   furosemide (LASIX) injection 40 mg BID   metoprolol (LOPRESSOR) tablet 100 mg BID   digoxin (LANOXIN) tablet 125 mcg Daily   amiodarone (CORDARONE) tablet 400 mg BID   Followed by    Rahul Shin ON 3/2/2018] amiodarone (CORDARONE) tablet 200 mg BID   amLODIPine (NORVASC) tablet 2.5 mg Daily   apixaban (ELIQUIS) tablet 5 mg BID   sacubitril-valsartan (ENTRESTO) 24-26 MG per tablet 1 tablet BID   metFORMIN (GLUCOPHAGE) tablet 850 mg BID WC   clonazePAM (KLONOPIN) tablet 0.5 mg Nightly PRN   ipratropium-albuterol (DUONEB) nebulizer solution 1 ampule As Directed RT PRN   atorvastatin (LIPITOR) tablet 10 mg Every Other Day   fluticasone (FLONASE) 50 MCG/ACT nasal spray 2 spray Daily   gabapentin (NEURONTIN) capsule 300 mg BID   hydrALAZINE (APRESOLINE) tablet 100 mg BID   nitroGLYCERIN

## 2018-02-21 NOTE — FLOWSHEET NOTE
Return call received from Dr Rohit Mesa, informed that patient's IV had been removed and he did not get evening dose of lasix, order received to give lasix 40 mg once orally

## 2018-02-22 NOTE — PROGRESS NOTES
Fermin Gagnon arrived to transport patient. Report given. Patient's wife already took patient's belongings home.

## 2018-02-22 NOTE — PROGRESS NOTES
Called taran to give report. Transferred to the unit receiving patient and no answer, message said to call back later.

## 2018-02-22 NOTE — PROGRESS NOTES
Report called and given to Pilar at [address] [place] [address] stated they would administer bedtime medications.

## 2018-02-23 ENCOUNTER — CARE COORDINATION (OUTPATIENT)
Dept: CASE MANAGEMENT | Age: 73
End: 2018-02-23

## 2018-02-23 ENCOUNTER — HOSPITAL ENCOUNTER (OUTPATIENT)
Age: 73
Setting detail: SPECIMEN
Discharge: HOME | End: 2018-02-23
Payer: MEDICARE

## 2018-02-23 LAB
DIGOXIN DATE LAST DOSE: NORMAL
DIGOXIN DOSE AMOUNT: NORMAL
DIGOXIN DOSE TIME: NORMAL
DIGOXIN LEVEL: 0.5 NG/ML (ref 0.5–2)

## 2018-02-23 PROCEDURE — 80162 ASSAY OF DIGOXIN TOTAL: CPT

## 2018-02-24 NOTE — DISCHARGE SUMMARY
transferred to long-term acute  care and the patient to follow with all the doctors as scheduled.         Khadar Haas    D: 02/23/2018 9:13:40       T: 02/23/2018 18:07:26     [email or web address]  Job#: [id number]     Doc#: [id number]    CC:

## 2018-02-26 ENCOUNTER — HOSPITAL ENCOUNTER (OUTPATIENT)
Age: 73
Setting detail: SPECIMEN
Discharge: HOME | End: 2018-02-26
Payer: MEDICARE

## 2018-02-26 LAB
BNP INTERPRETATION: ABNORMAL
PRO-BNP: 1313 PG/ML

## 2018-02-26 PROCEDURE — 83880 ASSAY OF NATRIURETIC PEPTIDE: CPT

## 2018-03-07 ENCOUNTER — CARE COORDINATION (OUTPATIENT)
Dept: CASE MANAGEMENT | Age: 73
End: 2018-03-07

## 2018-03-08 ENCOUNTER — CARE COORDINATION (OUTPATIENT)
Dept: CASE MANAGEMENT | Age: 73
End: 2018-03-08

## 2018-03-08 DIAGNOSIS — I50.43 CHF (CONGESTIVE HEART FAILURE), NYHA CLASS I, ACUTE ON CHRONIC, COMBINED (HCC): Primary | ICD-10-CM

## 2018-03-08 PROCEDURE — 1111F DSCHRG MED/CURRENT MED MERGE: CPT

## 2018-03-08 RX ORDER — LANOLIN ALCOHOL/MO/W.PET/CERES
1000 CREAM (GRAM) TOPICAL DAILY
COMMUNITY

## 2018-03-08 RX ORDER — FUROSEMIDE 40 MG/1
40 TABLET ORAL 2 TIMES DAILY
COMMUNITY

## 2018-03-08 RX ORDER — LISINOPRIL 40 MG/1
40 TABLET ORAL DAILY
COMMUNITY

## 2018-03-09 ENCOUNTER — OFFICE VISIT (OUTPATIENT)
Dept: PULMONOLOGY | Age: 73
End: 2018-03-09
Payer: MEDICARE

## 2018-03-09 ENCOUNTER — CARE COORDINATION (OUTPATIENT)
Dept: CASE MANAGEMENT | Age: 73
End: 2018-03-09

## 2018-03-09 VITALS
TEMPERATURE: 97.4 F | DIASTOLIC BLOOD PRESSURE: 56 MMHG | BODY MASS INDEX: 44.88 KG/M2 | SYSTOLIC BLOOD PRESSURE: 122 MMHG | HEIGHT: 69 IN | OXYGEN SATURATION: 92 % | RESPIRATION RATE: 16 BRPM | HEART RATE: 74 BPM | WEIGHT: 303 LBS

## 2018-03-09 DIAGNOSIS — G47.33 OSA (OBSTRUCTIVE SLEEP APNEA): ICD-10-CM

## 2018-03-09 DIAGNOSIS — J96.11 CHRONIC RESPIRATORY FAILURE WITH HYPOXIA (HCC): Primary | ICD-10-CM

## 2018-03-09 DIAGNOSIS — J41.0 SIMPLE CHRONIC BRONCHITIS (HCC): ICD-10-CM

## 2018-03-09 DIAGNOSIS — I50.22 CHRONIC SYSTOLIC CONGESTIVE HEART FAILURE (HCC): ICD-10-CM

## 2018-03-09 DIAGNOSIS — E66.01 MORBID OBESITY WITH BMI OF 45.0-49.9, ADULT (HCC): ICD-10-CM

## 2018-03-09 DIAGNOSIS — I10 ESSENTIAL HYPERTENSION: ICD-10-CM

## 2018-03-09 DIAGNOSIS — Z79.899 ON AMIODARONE THERAPY: ICD-10-CM

## 2018-03-09 DIAGNOSIS — E11.9 TYPE 2 DIABETES MELLITUS WITHOUT COMPLICATION, WITHOUT LONG-TERM CURRENT USE OF INSULIN (HCC): ICD-10-CM

## 2018-03-09 DIAGNOSIS — E66.9 RESTRICTIVE LUNG DISEASE SECONDARY TO OBESITY: ICD-10-CM

## 2018-03-09 DIAGNOSIS — Z28.21 REFUSED INFLUENZA VACCINE: ICD-10-CM

## 2018-03-09 DIAGNOSIS — J98.4 RESTRICTIVE LUNG DISEASE SECONDARY TO OBESITY: ICD-10-CM

## 2018-03-09 PROCEDURE — G8427 DOCREV CUR MEDS BY ELIG CLIN: HCPCS | Performed by: INTERNAL MEDICINE

## 2018-03-09 PROCEDURE — 4040F PNEUMOC VAC/ADMIN/RCVD: CPT | Performed by: INTERNAL MEDICINE

## 2018-03-09 PROCEDURE — 3017F COLORECTAL CA SCREEN DOC REV: CPT | Performed by: INTERNAL MEDICINE

## 2018-03-09 PROCEDURE — 1111F DSCHRG MED/CURRENT MED MERGE: CPT | Performed by: INTERNAL MEDICINE

## 2018-03-09 PROCEDURE — 3023F SPIROM DOC REV: CPT | Performed by: INTERNAL MEDICINE

## 2018-03-09 PROCEDURE — 3046F HEMOGLOBIN A1C LEVEL >9.0%: CPT | Performed by: INTERNAL MEDICINE

## 2018-03-09 PROCEDURE — 1123F ACP DISCUSS/DSCN MKR DOCD: CPT | Performed by: INTERNAL MEDICINE

## 2018-03-09 PROCEDURE — G8598 ASA/ANTIPLAT THER USED: HCPCS | Performed by: INTERNAL MEDICINE

## 2018-03-09 PROCEDURE — G8417 CALC BMI ABV UP PARAM F/U: HCPCS | Performed by: INTERNAL MEDICINE

## 2018-03-09 PROCEDURE — 99214 OFFICE O/P EST MOD 30 MIN: CPT | Performed by: INTERNAL MEDICINE

## 2018-03-09 PROCEDURE — 1036F TOBACCO NON-USER: CPT | Performed by: INTERNAL MEDICINE

## 2018-03-09 PROCEDURE — G8484 FLU IMMUNIZE NO ADMIN: HCPCS | Performed by: INTERNAL MEDICINE

## 2018-03-09 PROCEDURE — G8926 SPIRO NO PERF OR DOC: HCPCS | Performed by: INTERNAL MEDICINE

## 2018-03-09 NOTE — CARE COORDINATION
[facility] Transitions Follow Up Call    3/9/2018    Patient: John Harden SR  Patient : 1945   MRN: [id number]  Reason for Admission: There are no discharge diagnoses documented for the most recent discharge. Discharge Date: 18 RARS: Risk Score: 11.5       Spoke with: Staff @ [facility] office who verify that patient does not have Potassium ordered. Staff member transferred the call to the Nurse voice mail line. Message left with above concern, the name and  as well as home phone number of patient, my contact information. Requested that this be addressed with the patient ASAP. Care Transitions Subsequent and Final Call    Subsequent and Final Calls  Care Transitions Interventions  Other Interventions:             Follow Up  Future Appointments  Date Time Provider Luci Potter   3/9/2018 3:00 PM Samanta Ruffin MD Resp Spec Aaron Mancini   3/13/2018 8:45 AM DO Chuy Demarco Lk FP None   2018 9:00 AM SCHEDULE, [facility] RESPIRATORY SPEC Resp Spec [id number]   2018 9:30 AM Samanta Ruffin MD Resp Spec Grace Person RN

## 2018-03-09 NOTE — CARE COORDINATION
[facility] Transitions Follow Up Call    3/9/2018    Patient: Jean Mantilla SR  Patient : 1945   MRN: [id number]  Reason for Admission: There are no discharge diagnoses documented for the most recent discharge. Discharge Date: 18 RARS: Risk Score: 11.5       Spoke with: [facility] Nurse, [facility]. Explained that a medication review was completed and noted that he was taking Lasix 40 mg 2 times daily with no potassium listed. Stated that a high priority note was sent to the Cardiologist that has not been addressed yet. [facility] said that a nurse is going out today to admit and will review medications, address this issue. Contact information provided.        Follow Up  Future Appointments  Date Time Provider Luci Potter   3/9/2018 3:00 PM Adia Frey MD Resp Spec Higinio Drilling   3/13/2018 8:45 AM DO Chuy Cuevas Lk FP None   2018 9:00 AM SCHEDULE, [facility] RESPIRATORY SPEC Resp Spec [facility]   2018 9:30 AM Adia Frey MD Resp Spec Jacky Camejo RN

## 2018-03-14 ENCOUNTER — TELEPHONE (OUTPATIENT)
Dept: PULMONOLOGY | Age: 73
End: 2018-03-14

## (undated) DEVICE — ADHESIVE SKIN CLSR 0.7ML TOP DERMBND ADV

## (undated) DEVICE — 4.0MM PRECISION ROUND

## (undated) DEVICE — INTENDED FOR TISSUE SEPARATION, AND OTHER PROCEDURES THAT REQUIRE A SHARP SURGICAL BLADE TO PUNCTURE OR CUT.: Brand: BARD-PARKER ® CARBON RIB-BACK BLADES

## (undated) DEVICE — GLOVE SURG SZ 8 L12IN FNGR THK87MIL WHT LTX FREE

## (undated) DEVICE — CORD,CAUTERY,BIPOLAR,STERILE: Brand: MEDLINE

## (undated) DEVICE — DRESSING PETRO W3XL8IN OIL EMUL N ADH GZ KNIT IMPREG CELOS

## (undated) DEVICE — DRAPE,REIN 53X77,STERILE: Brand: MEDLINE

## (undated) DEVICE — CODMAN® SURGICAL PATTIES 1" X 1" (2.54CM X 2.54CM): Brand: CODMAN®

## (undated) DEVICE — PEN: MARKING STD 100/CS: Brand: MEDICAL ACTION INDUSTRIES

## (undated) DEVICE — GLOVE SURG SZ 75 L12IN FNGR THK87MIL WHT LTX FREE

## (undated) DEVICE — PROTECTOR EYE PT SELF ADH NS OPT GRD LF

## (undated) DEVICE — MEDI-VAC SUCTION HANDLE REGULAR CAPACITY: Brand: CARDINAL HEALTH

## (undated) DEVICE — GLOVE SURG SZ 85 L12IN FNGR THK87MIL WHT LTX FREE

## (undated) DEVICE — SHEET, ORTHO, SPLIT, STERILE: Brand: MEDLINE

## (undated) DEVICE — Device

## (undated) DEVICE — AGENT HEMSTAT 8ML FLX TIP MTRX + DISP SURGIFLO

## (undated) DEVICE — CHLORAPREP 26ML ORANGE

## (undated) DEVICE — 3M™ WARMING BLANKET, UPPER BODY, 10 PER CASE, 42268: Brand: BAIR HUGGER™

## (undated) DEVICE — 1010 S-DRAPE TOWEL DRAPE 10/BX: Brand: STERI-DRAPE™

## (undated) DEVICE — 3M(TM) MEDIPORE(TM) +PAD SOFT CLOTH ADHESIVE WOUND DRESSING 3569: Brand: 3M™ MEDIPORE™

## (undated) DEVICE — CONVERTED USE 338908 SPONGES LAP 18X18 ST

## (undated) DEVICE — GARMENT COMPR STD FOR 17IN CALF UNIF THER FLOTRN

## (undated) DEVICE — GLOVE SURG SZ 7 L12IN FNGR THK87MIL WHT LTX FREE

## (undated) DEVICE — MEDI-VAC NON-CONDUCTIVE SUCTION TUBING: Brand: CARDINAL HEALTH

## (undated) DEVICE — DISCONTINUED USE 405792 GLOVE SURG SENSICARE ALOE LT LF PF ST GRN SZ 7

## (undated) DEVICE — GOWN,AURORA,NON-REINFORCED,2XL: Brand: MEDLINE

## (undated) DEVICE — KIT POS TBL W O HDRST JACK

## (undated) DEVICE — DRAPE C ARM UNIV W41XL74IN CLR PLAS XR VELC CLSR POLY STRP